# Patient Record
Sex: FEMALE | Race: WHITE | NOT HISPANIC OR LATINO | Employment: FULL TIME | URBAN - METROPOLITAN AREA
[De-identification: names, ages, dates, MRNs, and addresses within clinical notes are randomized per-mention and may not be internally consistent; named-entity substitution may affect disease eponyms.]

---

## 2017-06-16 ENCOUNTER — HOSPITAL ENCOUNTER (OUTPATIENT)
Dept: GENERAL RADIOLOGY | Facility: HOSPITAL | Age: 37
Discharge: HOME | End: 2017-06-16
Attending: FAMILY MEDICINE | Admitting: FAMILY MEDICINE

## 2017-06-16 ENCOUNTER — TRANSCRIBE ORDERS (OUTPATIENT)
Dept: ADMINISTRATIVE | Facility: HOSPITAL | Age: 37
End: 2017-06-16

## 2017-06-16 DIAGNOSIS — S99.911A ANKLE INJURY, RIGHT, INITIAL ENCOUNTER: Primary | ICD-10-CM

## 2017-06-16 PROCEDURE — 73610 X-RAY EXAM OF ANKLE: CPT

## 2018-03-15 ENCOUNTER — OFFICE VISIT (OUTPATIENT)
Dept: OBSTETRICS AND GYNECOLOGY | Facility: CLINIC | Age: 38
End: 2018-03-15

## 2018-03-15 VITALS — RESPIRATION RATE: 14 BRPM | SYSTOLIC BLOOD PRESSURE: 118 MMHG | WEIGHT: 226 LBS | DIASTOLIC BLOOD PRESSURE: 76 MMHG

## 2018-03-15 DIAGNOSIS — N90.89 CLITORAL IRRITATION: Primary | ICD-10-CM

## 2018-03-15 PROBLEM — F17.200 SMOKER: Status: ACTIVE | Noted: 2018-03-15

## 2018-03-15 PROBLEM — Z01.419 WELL WOMAN EXAM WITH ROUTINE GYNECOLOGICAL EXAM: Status: ACTIVE | Noted: 2018-03-15

## 2018-03-15 PROCEDURE — 99202 OFFICE O/P NEW SF 15 MIN: CPT | Performed by: OBSTETRICS & GYNECOLOGY

## 2018-03-15 RX ORDER — RANITIDINE 150 MG/1
150 TABLET ORAL 2 TIMES DAILY
COMMUNITY
End: 2020-06-05

## 2018-03-15 RX ORDER — LIDOCAINE AND PRILOCAINE 25; 25 MG/G; MG/G
CREAM TOPICAL
Qty: 5 G | Refills: 0 | Status: SHIPPED | OUTPATIENT
Start: 2018-03-15 | End: 2018-11-02

## 2018-03-15 NOTE — PROGRESS NOTES
Subjective   Chief Complaint   Patient presents with   • Establish Care     Alla Trammell is a 38 y.o. year old .  Patient's last menstrual period was 2018 (exact date).  She presents to be seen because of Trouble with a vertical clitoral fontana piercing.  The retention ball on one and got displaced were dislodged during masturbation.  It is now minimally symptomatic.  It was irritated and red for a while but that is improved.  It was placed about 15 years ago.  When she is hoping is that the piercing to be manipulated in such a fashion that it is now correctly oriented.    OTHER THINGS SHE WANTS TO DISCUSS TODAY:  Nothing else    The following portions of the patient's history were reviewed and updated as appropriate:current medications, allergies, past family history, past medical history, past social history and past surgical history    Smoking status: Current Every Day Smoker                                                   Packs/day: 0.25      Years: 0.00         Types: Cigarettes     Start date:      Smokeless tobacco: Not on file                       Review of Systems  Constitutional POS: nothing reported    NEG: anorexia or night sweats   Genitourinary POS: nothing reported    NEG: dysuria or hematuria   Gastointestinal POS: nothing reported    NEG: bloating, change in bowel habits, melena or reflux symptoms   Integument POS: nothing reported    NEG: moles that are changing in size, shape, color or rashes   Breast POS: nothing reported    NEG: persistent breast lump, skin dimpling or nipple discharge         Objective   /76   Resp 14   Wt 103 kg (226 lb)   LMP 2018 (Exact Date)   Breastfeeding? No     General:  well developed; well nourished  no acute distress   Pelvis: Clinical staff was present for exam  External genitalia:  The vertical clitoral piercing is intact.  The ball at the upper strut is buried below the skin which is intact.  The ball at the lower portion of  the strut protrudes normally below the clitoral fontana     Lab Review   No data reviewed    Imaging   No data reviewed        Assessment   1. Buried retention ball of clitoral piercing     Plan   1. We use EMLA cream topically and then have her come back.  At that point anticipate small injection with lidocaine followed by unroofing the ball  2. The importance of keeping all planned follow-up and taking all medications as prescribed was emphasized.    New Medications Ordered This Visit   Medications   • lidocaine-prilocaine (EMLA) 2.5-2.5 % cream     Sig: Every 30 minutes for 2-3 hours in advance of the appointment     Dispense:  5 g     Refill:  0          This note was electronically signed.    Moise Grove M.D.  March 15, 2018    Note: Speech recognition transcription software may have been used to create portions of this document.  An attempt at proofreading has been made but errors in transcription could still be present.

## 2018-03-23 ENCOUNTER — OFFICE VISIT (OUTPATIENT)
Dept: OBSTETRICS AND GYNECOLOGY | Facility: CLINIC | Age: 38
End: 2018-03-23

## 2018-03-23 VITALS — SYSTOLIC BLOOD PRESSURE: 122 MMHG | WEIGHT: 224 LBS | DIASTOLIC BLOOD PRESSURE: 76 MMHG | RESPIRATION RATE: 14 BRPM

## 2018-03-23 DIAGNOSIS — N90.89 CLITORAL IRRITATION: Primary | ICD-10-CM

## 2018-03-23 PROCEDURE — 99212 OFFICE O/P EST SF 10 MIN: CPT | Performed by: OBSTETRICS & GYNECOLOGY

## 2018-03-23 NOTE — PROGRESS NOTES
She comes in today having applied the EMLA cream in advance.  The purpose of today's visit is to help push the leading edge of the piercing back through the skin.  The area was injected with 2 cc of lidocaine and epinephrine.  At that point an 18-gauge needle was used to unroofed the opening that was present just slightly.  At that point the bulk be pushed through the opening area and bleeding was scant.  She was encouraged to keep the piercing in place for the next 3-4 weeks so as to allow the tract to become more permanently patent.  If she has issues with fever, redness or drainage she'll let me now.      Moise Grove M.D.  March 23, 2018  1:17 PM

## 2018-11-02 ENCOUNTER — OFFICE VISIT (OUTPATIENT)
Dept: OBSTETRICS AND GYNECOLOGY | Facility: CLINIC | Age: 38
End: 2018-11-02

## 2018-11-02 ENCOUNTER — LAB (OUTPATIENT)
Dept: LAB | Facility: HOSPITAL | Age: 38
End: 2018-11-02

## 2018-11-02 VITALS
DIASTOLIC BLOOD PRESSURE: 68 MMHG | WEIGHT: 221 LBS | SYSTOLIC BLOOD PRESSURE: 128 MMHG | BODY MASS INDEX: 37.73 KG/M2 | HEIGHT: 64 IN

## 2018-11-02 DIAGNOSIS — Z01.419 WELL WOMAN EXAM: Primary | ICD-10-CM

## 2018-11-02 DIAGNOSIS — Z70.8 SEXUALLY TRANSMITTED DISEASE COUNSELING: ICD-10-CM

## 2018-11-02 DIAGNOSIS — Z30.09 GENERAL COUNSELING AND ADVICE ON CONTRACEPTIVE MANAGEMENT: ICD-10-CM

## 2018-11-02 LAB
HBV SURFACE AG SERPL QL IA: NORMAL
HCV AB SER DONR QL: NORMAL
HIV1+2 AB SER QL: NORMAL

## 2018-11-02 PROCEDURE — 87340 HEPATITIS B SURFACE AG IA: CPT

## 2018-11-02 PROCEDURE — G0432 EIA HIV-1/HIV-2 SCREEN: HCPCS

## 2018-11-02 PROCEDURE — 36415 COLL VENOUS BLD VENIPUNCTURE: CPT | Performed by: OBSTETRICS & GYNECOLOGY

## 2018-11-02 PROCEDURE — 86803 HEPATITIS C AB TEST: CPT

## 2018-11-02 PROCEDURE — 99395 PREV VISIT EST AGE 18-39: CPT | Performed by: OBSTETRICS & GYNECOLOGY

## 2018-11-02 PROCEDURE — 86592 SYPHILIS TEST NON-TREP QUAL: CPT | Performed by: OBSTETRICS & GYNECOLOGY

## 2018-11-02 NOTE — PROGRESS NOTES
Subjective   Chief Complaint   Patient presents with   • Gynecologic Exam     Maine esquivel, here for annual exam, desires contraception. C/O's impending vaginal infection     Alla Trammell is a 38 y.o. year old  presenting to be seen for her annual exam.     SEXUAL Hx:  She is currently sexually active.  In the past year there there has been MORE THAN ONE new sexual partner.    Condoms are always used.  She would like to be screened for STD's at today's exam.  Current birth control method: condoms.  She is not happy with her current method of contraception and does want to discuss alternative methods of contraception.  MENSTRUAL Hx:  Patient's last menstrual period was 10/22/2018 (exact date).  In the past 6 months her cycles have been regular, predictable and occur monthly.  Her menstrual flow is typically moderately heavy.   Each month on average there are roughly 2 day(s) of very heavy flow. Duration 6-7 days  Intermenstrual bleeding is present but none in the past 6 months  Post-coital bleeding is absent.  Dysmenorrhea: moderate and is not affecting her activities of daily living  PMS: moderate and is not affecting her activities of daily living  Her cycles ARE a source of concern for her that she wishes to discuss today.  HEALTH Hx:  She exercises regularly: no (but is planning to start exercising more ).  She wears her seat belt: yes.  She has concerns about domestic violence: no.  OTHER THINGS SHE WANTS TO DISCUSS TODAY:  She desires to discuss long acting contraception today. Potentially interested in IUD.  She reports her ex- had been cheating on her.  She would like to be screened for STDs today.    The following portions of the patient's history were reviewed and updated as appropriate:problem list, current medications, allergies, past family history, past medical history, past social history and past surgical history.    Smoking status: Current Every Day Smoker                               "                     Packs/day: 0.25      Years: 0.00         Types: Cigarettes     Start date: 1999     Smokeless tobacco: Not on file                       Review of Systems  Constitutional POS: nothing reported    NEG: anorexia or night sweats   Genitourinary POS: nothing reported    NEG: dysuria or hematuria      Gastointestinal POS: nothing reported    NEG: bloating, change in bowel habits, melena or reflux symptoms   Integument POS: nothing reported    NEG: moles that are changing in size, shape, color or rashes   Breast POS: nothing reported    NEG: persistent breast lump, skin dimpling or nipple discharge        Objective   /68 (Patient Position: Sitting)   Ht 162.6 cm (64\")   Wt 100 kg (221 lb)   LMP 10/22/2018 (Exact Date)   BMI 37.93 kg/m²     General:  well developed; well nourished  no acute distress   Skin:  No suspicious lesions seen   Thyroid: normal to inspection and palpation   Breasts:  Examined in supine position  Symmetric without masses or skin dimpling  Nipples normal without inversion, lesions or discharge  There are no palpable axillary nodes   Abdomen: soft, non-tender; no masses  no umbilical or inginual hernias are present  no hepato-splenomegaly   Pelvis: Clinical staff was present for exam  External genitalia:  normal appearance of the external genitalia including Bartholin's and Chisana's glands.  :  urethral meatus normal;  Vaginal:  normal pink mucosa without prolapse or lesions.  Cervix:  normal appearance.  Uterus:  normal size, shape and consistency.  Adnexa:  normal bimanual exam of the adnexa.        Assessment   1. Normal GYN exam  2. STI screening  3. Contraceptive counseling  4. Vaginal irritation      Plan   1. Pap and STD testing was done today.  If she does not receive the results of the Pap within 2 weeks  time, she was instructed to call to find out the results.  I explained to Alla that the recommendations for Pap smear interval in a low risk patient's has " lengthened to 3 years time.  I encouraged her to be seen yearly for a full physical exam including breast and pelvic exam even during the off years when PAP's will not be performed.  2. Serum STD testing ordered as well  3. No prescription was given or electronically sent at today's visit  4. The importance of keeping all planned follow-up and taking all medications as prescribed was emphasized.  5. Follow up for annual exam and for IUD placement in 1 year and in 1-2 weeks.     No orders of the defined types were placed in this encounter.         This note was electronically signed.    Venice Moralez MD  November 2, 2018    Note: Speech recognition transcription software may have been used to create portions of this document.  An attempt at proofreading has been made but errors in transcription could still be present.

## 2018-11-05 ENCOUNTER — TELEPHONE (OUTPATIENT)
Dept: OBSTETRICS AND GYNECOLOGY | Facility: CLINIC | Age: 38
End: 2018-11-05

## 2018-11-05 LAB — RPR SER QL: NORMAL

## 2018-11-05 NOTE — TELEPHONE ENCOUNTER
Called patient and reviewed negative STD bloodwork. Will call back once remainder of labs results.     Venice Moralez MD

## 2018-11-06 ENCOUNTER — TELEPHONE (OUTPATIENT)
Dept: OBSTETRICS AND GYNECOLOGY | Facility: CLINIC | Age: 38
End: 2018-11-06

## 2018-11-06 RX ORDER — METRONIDAZOLE 500 MG/1
500 TABLET ORAL 2 TIMES DAILY
Qty: 14 TABLET | Refills: 0 | Status: SHIPPED | OUTPATIENT
Start: 2018-11-06 | End: 2018-11-13

## 2018-11-06 NOTE — TELEPHONE ENCOUNTER
Reviewed negative STD cultures with patient and + for BV. Flagyl rx sent to pharmacy and advised no alcohol consumption while taking it. Will call back once pap results are back.   Venice Moralez MD

## 2018-12-07 ENCOUNTER — OFFICE VISIT (OUTPATIENT)
Dept: OBSTETRICS AND GYNECOLOGY | Facility: CLINIC | Age: 38
End: 2018-12-07

## 2018-12-07 VITALS — DIASTOLIC BLOOD PRESSURE: 70 MMHG | SYSTOLIC BLOOD PRESSURE: 122 MMHG

## 2018-12-07 DIAGNOSIS — Z30.09 GENERAL COUNSELING AND ADVICE ON CONTRACEPTIVE MANAGEMENT: Primary | ICD-10-CM

## 2018-12-07 DIAGNOSIS — Z30.017 ENCOUNTER FOR INITIAL PRESCRIPTION OF NEXPLANON: ICD-10-CM

## 2018-12-07 PROCEDURE — 99213 OFFICE O/P EST LOW 20 MIN: CPT | Performed by: OBSTETRICS & GYNECOLOGY

## 2018-12-07 PROCEDURE — 11981 INSERTION DRUG DLVR IMPLANT: CPT | Performed by: OBSTETRICS & GYNECOLOGY

## 2018-12-07 NOTE — PROGRESS NOTES
Nexplanon Insertion    Patient's last menstrual period was 11/26/2018 (approximate).    Date of procedure:  12/7/2018    Risks and benefits discussed? yes  All questions answered? yes  Consents given by the patient  Written consent obtained? yes    Local anesthesia used:  yes - 5 cc's of  Meds; anesthesia local: 1% lidocaine    Procedure documentation:    The upper right arm (dominant) was marked at the intended site of insertion.  She has tattoos on her left arm. Betadine was used to cleanse the skin.  Local anesthesia was injected.  The Nexplanon was placed subdermally without difficulty.  The devise was able to be palpated in the arm by both myself and Alla.  Steri-strips were then placed across the site of insertion and the arm was wrapped.    She tolerated the procedure well.  There were no complications.  EBL was minimal.    Post procedure instructions: Remove the wrapping in 24 hours and the steri-strips in 5 days.    Follow up needed: PRN    This note was electronically signed.    Venice Moralez MD  December 7, 2018

## 2018-12-07 NOTE — PROGRESS NOTES
Subjective   Chief Complaint   Patient presents with   • Gynecologic Exam     desires OCP's instead of IUD     Alla Trammell is a 38 y.o. year old .  Patient's last menstrual period was 2018 (approximate).  She presents to be seen because of desiring to talk about contraception again. She no longer wants the IUD but definitely desires contraception. Reports her partner does not want her to have the IUD because one of his previous partners had one and he could feel the strings. She reports smoker 1/2 ppd    OTHER THINGS SHE WANTS TO DISCUSS TODAY:  Nothing else    The following portions of the patient's history were reviewed and updated as appropriate:current medications and allergies    Social History    Tobacco Use      Smoking status: Current Every Day Smoker        Packs/day: 0.50        Types: Cigarettes        Start date:     Review of Systems  Constitutional POS: nothing reported    NEG: anorexia or night sweats   Genitourinary POS: nothing reported    NEG: dysuria or hematuria   Gastointestinal POS: nothing reported    NEG: bloating, change in bowel habits, melena or reflux symptoms   Integument POS: nothing reported    NEG: moles that are changing in size, shape, color or rashes   Breast POS: nothing reported    NEG: persistent breast lump, skin dimpling or nipple discharge         Objective   /70 (Patient Position: Sitting)   LMP 2018 (Approximate)     General:  well developed; well nourished  no acute distress   Skin:  Not performed.   Thyroid: not examined   Lungs:  breathing is unlabored   Heart:  Not performed.   Breasts:  Not performed.   Abdomen: Not performed.   Pelvis: Not performed.     Lab Review   No data reviewed    Imaging   No data reviewed        Assessment   1. Contraceptive counseling     Plan   1. Reviewed options again with patient in detail including avoidance of estrogen containing contraception due to age >35 years old and smoking 1/2 ppd. Reviewed  progesterone only options and she desires nexplanon. See procedure note  2. The importance of keeping all planned follow-up and taking all medications as prescribed was emphasized.  3. Follow up for annual exam in one year or sooner if needed.     No orders of the defined types were placed in this encounter.         This note was electronically signed.    Venice Moralez MD  December 7, 2018    Note: Speech recognition transcription software may have been used to create portions of this document.  An attempt at proofreading has been made but errors in transcription could still be present.

## 2020-02-27 ENCOUNTER — TRANSCRIBE ORDERS (OUTPATIENT)
Dept: ADMINISTRATIVE | Facility: HOSPITAL | Age: 40
End: 2020-02-27

## 2020-02-27 ENCOUNTER — HOSPITAL ENCOUNTER (OUTPATIENT)
Dept: GENERAL RADIOLOGY | Facility: HOSPITAL | Age: 40
Discharge: HOME | End: 2020-02-27
Admitting: NURSE PRACTITIONER

## 2020-02-27 DIAGNOSIS — M25.50 ARTHRALGIA, UNSPECIFIED JOINT: ICD-10-CM

## 2020-02-27 DIAGNOSIS — M25.50 ARTHRALGIA, UNSPECIFIED JOINT: Primary | ICD-10-CM

## 2020-02-27 PROCEDURE — 72052 X-RAY EXAM NECK SPINE 6/>VWS: CPT

## 2020-03-23 ENCOUNTER — TELEPHONE (OUTPATIENT)
Dept: OBSTETRICS AND GYNECOLOGY | Facility: CLINIC | Age: 40
End: 2020-03-23

## 2020-03-23 NOTE — TELEPHONE ENCOUNTER
Called pt; pt does not think she has a yeast infection-offered pt appt on Thursday  
Dr Moralez Pt    Pt calls with complaints of an episode of sharp vaginal pain, vaginal odor and discharge (white and thick).  Sx started odor two weeks ago, discharge two days ago.  She had one episode today of sharp pain in vaginal area lasting about 30 minutes.  No bleeding, no fever.    Callback [phone]  [facility]  
7

## 2020-03-25 ENCOUNTER — TELEPHONE (OUTPATIENT)
Dept: OBSTETRICS AND GYNECOLOGY | Facility: CLINIC | Age: 40
End: 2020-03-25

## 2020-03-25 NOTE — TELEPHONE ENCOUNTER
Dr Moralez Pt    Pt was called to screen for corona virus.  She stated that she was in contact with a person on March 14, 2020 who was tested for [id number].  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 The [id number] test was negative.    Pt was told she would be called back by Dr. Moralez, per Dr. Moralez.    Callback [phone]

## 2020-03-25 NOTE — TELEPHONE ENCOUNTER
Called patient and reviewed complaint.  She reports she intermittently had a sharp pain on her cervix a few weeks ago.  She currently denies any pain over the past 24 to 48 hours.  She reports she did notice some discharge within the past few days but currently reports it is better and currently has no itching or odor.  She does have a Nexplanon in place for contraceptive benefit.  She reports her partner did have unprotected intercourse with another partner in the past.  Reviewed with patient and she desires to have STD screening and she can be scheduled to be seen next week given that would be 14 days from her time of exposure even though the person was negative for Covid 19.  So can patient please be called to see if she desires to schedule an appointment next week for STD screening.  Otherwise if she has no issues she does not have to be seen in the office.    Thanks,  Venice Moralez MD

## 2020-06-02 ENCOUNTER — APPOINTMENT (OUTPATIENT)
Dept: PREADMISSION TESTING | Facility: HOSPITAL | Age: 40
End: 2020-06-02

## 2020-06-02 PROCEDURE — C9803 HOPD COVID-19 SPEC COLLECT: HCPCS

## 2020-06-02 PROCEDURE — U0002 COVID-19 LAB TEST NON-CDC: HCPCS

## 2020-06-02 PROCEDURE — U0004 COV-19 TEST NON-CDC HGH THRU: HCPCS

## 2020-06-03 ENCOUNTER — APPOINTMENT (OUTPATIENT)
Dept: PREADMISSION TESTING | Facility: HOSPITAL | Age: 40
End: 2020-06-03

## 2020-06-03 LAB
REF LAB TEST METHOD: NORMAL
SARS-COV-2 RNA RESP QL NAA+PROBE: NOT DETECTED

## 2020-06-05 ENCOUNTER — OUTSIDE FACILITY SERVICE (OUTPATIENT)
Dept: GASTROENTEROLOGY | Facility: CLINIC | Age: 40
End: 2020-06-05

## 2020-06-05 ENCOUNTER — LAB REQUISITION (OUTPATIENT)
Dept: LAB | Facility: HOSPITAL | Age: 40
End: 2020-06-05

## 2020-06-05 ENCOUNTER — TELEPHONE (OUTPATIENT)
Dept: GASTROENTEROLOGY | Facility: CLINIC | Age: 40
End: 2020-06-05

## 2020-06-05 DIAGNOSIS — R12 HEARTBURN: ICD-10-CM

## 2020-06-05 DIAGNOSIS — K21.00 GASTROESOPHAGEAL REFLUX DISEASE WITH ESOPHAGITIS: Primary | ICD-10-CM

## 2020-06-05 DIAGNOSIS — K21.9 GASTRO-ESOPHAGEAL REFLUX DISEASE WITHOUT ESOPHAGITIS: ICD-10-CM

## 2020-06-05 PROCEDURE — 88305 TISSUE EXAM BY PATHOLOGIST: CPT | Performed by: INTERNAL MEDICINE

## 2020-06-05 PROCEDURE — 43239 EGD BIOPSY SINGLE/MULTIPLE: CPT | Performed by: INTERNAL MEDICINE

## 2020-06-05 RX ORDER — PANTOPRAZOLE SODIUM 40 MG/1
40 TABLET, DELAYED RELEASE ORAL DAILY
Qty: 30 TABLET | Refills: 5 | Status: SHIPPED | OUTPATIENT
Start: 2020-06-05 | End: 2020-09-15

## 2020-06-05 NOTE — TELEPHONE ENCOUNTER
Dr. Steven  Ms. Selby called and stated he  today thought you had said you were going to send a Rx? Can you advise.  Thank you,  Karoline

## 2020-06-08 LAB
CYTO UR: NORMAL
LAB AP CASE REPORT: NORMAL
LAB AP CLINICAL INFORMATION: NORMAL
LAB AP DIAGNOSIS COMMENT: NORMAL
PATH REPORT.FINAL DX SPEC: NORMAL
PATH REPORT.GROSS SPEC: NORMAL

## 2020-09-11 ENCOUNTER — TELEMEDICINE (OUTPATIENT)
Dept: GASTROENTEROLOGY | Facility: CLINIC | Age: 40
End: 2020-09-11

## 2020-09-11 DIAGNOSIS — K21.00 GASTROESOPHAGEAL REFLUX DISEASE WITH ESOPHAGITIS: Primary | ICD-10-CM

## 2020-09-11 DIAGNOSIS — R12 HEARTBURN: ICD-10-CM

## 2020-09-11 PROCEDURE — 99214 OFFICE O/P EST MOD 30 MIN: CPT | Performed by: INTERNAL MEDICINE

## 2020-09-12 NOTE — PROGRESS NOTES
PCP: Ethan Newman MD    No chief complaint on file.  Issue with heartburn.    History of Present Illness:   HPI  This was a video visit. The patient consented for a video visit.  Mrs. Selby says the Protonix medication does not give her any relief. She may have one or two good days during the course of a week. There is a sour brash taste in the back of her throat. The patient denies difficult or painful swallowing. Mrs. Selby says that previously Dexilant gave complete relief. However, due to her insurance this was not covered. She has been on Omeprazole and Pepcid without relief.  Mrs. Selby denies any localized abdominal pain. There is no issue with nausea or bloating.  Past Medical History:   Diagnosis Date   • H/O chlamydia infection 2001   • History of anxiety 2017       Past Surgical History:   Procedure Laterality Date   • GUM SURGERY  2001   • WISDOM TOOTH EXTRACTION  2000         Current Outpatient Medications:   •  pantoprazole (PROTONIX) 40 MG EC tablet, Take 1 tablet by mouth Daily., Disp: 30 tablet, Rfl: 5    No Known Allergies    Family History   Problem Relation Age of Onset   • Breast cancer Maternal Grandmother         70   • Ovarian cancer Mother    • Colon cancer Neg Hx        Social History     Socioeconomic History   • Marital status:      Spouse name: Not on file   • Number of children: Not on file   • Years of education: Not on file   • Highest education level: Not on file   Tobacco Use   • Smoking status: Current Every Day Smoker     Packs/day: 0.50     Types: Cigarettes     Start date: 1999   Substance and Sexual Activity   • Alcohol use: Yes     Comment: Social ETOH use    • Drug use: No   • Sexual activity: Yes     Partners: Male       Review of Systems   Constitutional: Negative for appetite change, fatigue, fever and unexpected weight change.   HENT: Negative for dental problem, mouth sores, postnasal drip, sneezing, trouble swallowing and voice change.    Eyes: Negative for  pain, redness and itching.   Respiratory: Negative for cough, shortness of breath and wheezing.    Cardiovascular: Negative for chest pain, palpitations and leg swelling.   Gastrointestinal: Negative for abdominal distention, abdominal pain, anal bleeding, blood in stool, constipation, diarrhea, nausea, rectal pain and vomiting.        Heartburn   Endocrine: Negative for cold intolerance, heat intolerance, polydipsia and polyuria.   Genitourinary: Negative for dysuria, enuresis, flank pain, hematuria and urgency.   Musculoskeletal: Negative for arthralgias, back pain, joint swelling and myalgias.   Skin: Negative for color change, pallor and rash.   Allergic/Immunologic: Negative for environmental allergies, food allergies and immunocompromised state.   Neurological: Negative for dizziness, tremors, seizures, facial asymmetry, numbness and headaches.   Psychiatric/Behavioral: Negative for behavioral problems, dysphoric mood, hallucinations and self-injury.       There were no vitals filed for this visit.    Physical Exam   Constitutional: She is oriented to person, place, and time. She appears well-nourished. No distress.   HENT:   Head: Atraumatic.   Mouth/Throat: Oropharynx is clear and moist.   Eyes: EOM are normal. No scleral icterus.   Musculoskeletal: Normal range of motion. She exhibits no edema.   Neurological: She is alert and oriented to person, place, and time. She exhibits normal muscle tone.   Skin: Skin is dry. No erythema.   Psychiatric: She has a normal mood and affect. Her behavior is normal.       Diagnoses and all orders for this visit:    Gastroesophageal reflux disease with esophagitis    Heartburn    The patient is experiencing symptoms regularly. There are no warning issues with difficult swallowing or weight loss.      Plan: Discussed a trial of Prevacid. Also suggest to check with her formulary with regard to Dexilant.            Continue lifestyle modifications.             Follow up in 6  months.      This was a video visit for 15 minutes.

## 2020-09-15 ENCOUNTER — TELEPHONE (OUTPATIENT)
Dept: GASTROENTEROLOGY | Facility: CLINIC | Age: 40
End: 2020-09-15

## 2020-09-15 DIAGNOSIS — K21.00 GASTROESOPHAGEAL REFLUX DISEASE WITH ESOPHAGITIS: Primary | ICD-10-CM

## 2020-09-15 RX ORDER — LANSOPRAZOLE 30 MG/1
30 CAPSULE, DELAYED RELEASE ORAL DAILY
Qty: 30 CAPSULE | Refills: 5 | Status: SHIPPED | OUTPATIENT
Start: 2020-09-15

## 2020-09-15 NOTE — TELEPHONE ENCOUNTER
Called and s/w pt in re: to Patient Assistance for Dexilant; pt request forms to be mailed and she will complete and return them asap.  She has also request RX lansoprazole until determination for Dexilant is sent. I advised I would consult with Dr. Steven in that regards. Pt had no further concerns at this time.

## 2020-10-19 ENCOUNTER — TELEPHONE (OUTPATIENT)
Dept: GASTROENTEROLOGY | Facility: CLINIC | Age: 40
End: 2020-10-19

## 2020-10-19 NOTE — TELEPHONE ENCOUNTER
Patient called to inform you that lansoprazole is working and she is going to continue that for now.

## 2021-02-08 ENCOUNTER — TRANSCRIBE ORDERS (OUTPATIENT)
Dept: ADMINISTRATIVE | Facility: HOSPITAL | Age: 41
End: 2021-02-08

## 2021-02-08 DIAGNOSIS — N63.25 BREAST LUMP ON LEFT SIDE AT 6 O'CLOCK POSITION: Primary | ICD-10-CM

## 2021-03-08 ENCOUNTER — HOSPITAL ENCOUNTER (OUTPATIENT)
Dept: MAMMOGRAPHY | Facility: HOSPITAL | Age: 41
Discharge: HOME | End: 2021-03-08

## 2021-03-08 ENCOUNTER — HOSPITAL ENCOUNTER (OUTPATIENT)
Dept: ULTRASOUND IMAGING | Facility: HOSPITAL | Age: 41
Discharge: HOME | End: 2021-03-08

## 2021-03-08 DIAGNOSIS — N63.25 BREAST LUMP ON LEFT SIDE AT 6 O'CLOCK POSITION: ICD-10-CM

## 2021-03-08 PROCEDURE — 76642 ULTRASOUND BREAST LIMITED: CPT | Performed by: RADIOLOGY

## 2021-03-08 PROCEDURE — 77066 DX MAMMO INCL CAD BI: CPT | Performed by: RADIOLOGY

## 2021-03-08 PROCEDURE — 76642 ULTRASOUND BREAST LIMITED: CPT

## 2021-03-08 PROCEDURE — 77066 DX MAMMO INCL CAD BI: CPT

## 2021-03-08 PROCEDURE — 77062 BREAST TOMOSYNTHESIS BI: CPT | Performed by: RADIOLOGY

## 2021-03-08 PROCEDURE — G0279 TOMOSYNTHESIS, MAMMO: HCPCS

## 2021-03-12 ENCOUNTER — TRANSCRIBE ORDERS (OUTPATIENT)
Dept: MAMMOGRAPHY | Facility: HOSPITAL | Age: 41
End: 2021-03-12

## 2021-03-12 DIAGNOSIS — R92.8 ABNORMAL MAMMOGRAM: Primary | ICD-10-CM

## 2021-09-14 ENCOUNTER — HOSPITAL ENCOUNTER (OUTPATIENT)
Dept: ULTRASOUND IMAGING | Facility: HOSPITAL | Age: 41
Discharge: HOME | End: 2021-09-14

## 2021-09-14 ENCOUNTER — HOSPITAL ENCOUNTER (OUTPATIENT)
Dept: MAMMOGRAPHY | Facility: HOSPITAL | Age: 41
Discharge: HOME | End: 2021-09-14

## 2021-09-14 ENCOUNTER — TRANSCRIBE ORDERS (OUTPATIENT)
Dept: MAMMOGRAPHY | Facility: HOSPITAL | Age: 41
End: 2021-09-14

## 2021-09-14 DIAGNOSIS — R92.8 ABNORMAL MAMMOGRAM: ICD-10-CM

## 2021-09-14 DIAGNOSIS — R92.8 ABNORMAL MAMMOGRAM: Primary | ICD-10-CM

## 2021-09-14 PROCEDURE — 77066 DX MAMMO INCL CAD BI: CPT | Performed by: RADIOLOGY

## 2021-09-14 PROCEDURE — 76642 ULTRASOUND BREAST LIMITED: CPT

## 2021-09-14 PROCEDURE — G0279 TOMOSYNTHESIS, MAMMO: HCPCS

## 2021-09-14 PROCEDURE — 76642 ULTRASOUND BREAST LIMITED: CPT | Performed by: RADIOLOGY

## 2021-09-14 PROCEDURE — 77066 DX MAMMO INCL CAD BI: CPT

## 2022-03-17 ENCOUNTER — TRANSCRIBE ORDERS (OUTPATIENT)
Dept: MAMMOGRAPHY | Facility: HOSPITAL | Age: 42
End: 2022-03-17

## 2022-03-17 ENCOUNTER — HOSPITAL ENCOUNTER (OUTPATIENT)
Dept: MAMMOGRAPHY | Facility: HOSPITAL | Age: 42
Discharge: HOME | End: 2022-03-17
Admitting: NURSE PRACTITIONER

## 2022-03-17 DIAGNOSIS — R92.8 ABNORMAL MAMMOGRAM: Primary | ICD-10-CM

## 2022-03-17 DIAGNOSIS — R92.8 ABNORMAL MAMMOGRAM: ICD-10-CM

## 2022-03-17 PROCEDURE — 77066 DX MAMMO INCL CAD BI: CPT | Performed by: RADIOLOGY

## 2022-03-17 PROCEDURE — 77066 DX MAMMO INCL CAD BI: CPT

## 2022-03-17 PROCEDURE — G0279 TOMOSYNTHESIS, MAMMO: HCPCS

## 2022-03-17 PROCEDURE — 77062 BREAST TOMOSYNTHESIS BI: CPT | Performed by: RADIOLOGY

## 2022-10-21 ENCOUNTER — HOSPITAL ENCOUNTER (OUTPATIENT)
Dept: ULTRASOUND IMAGING | Facility: HOSPITAL | Age: 42
Discharge: HOME | End: 2022-10-21

## 2022-10-21 ENCOUNTER — HOSPITAL ENCOUNTER (OUTPATIENT)
Dept: MAMMOGRAPHY | Facility: HOSPITAL | Age: 42
Discharge: HOME | End: 2022-10-21

## 2022-10-21 ENCOUNTER — TRANSCRIBE ORDERS (OUTPATIENT)
Dept: MAMMOGRAPHY | Facility: HOSPITAL | Age: 42
End: 2022-10-21

## 2022-10-21 DIAGNOSIS — R92.8 ABNORMAL MAMMOGRAM: Primary | ICD-10-CM

## 2022-10-21 DIAGNOSIS — R92.8 ABNORMAL MAMMOGRAM: ICD-10-CM

## 2022-10-21 PROCEDURE — 77066 DX MAMMO INCL CAD BI: CPT | Performed by: RADIOLOGY

## 2022-10-21 PROCEDURE — 76642 ULTRASOUND BREAST LIMITED: CPT | Performed by: RADIOLOGY

## 2022-10-21 PROCEDURE — 76642 ULTRASOUND BREAST LIMITED: CPT

## 2022-10-21 PROCEDURE — 77062 BREAST TOMOSYNTHESIS BI: CPT | Performed by: RADIOLOGY

## 2022-10-21 PROCEDURE — 77066 DX MAMMO INCL CAD BI: CPT

## 2025-03-14 ENCOUNTER — OFFICE VISIT (OUTPATIENT)
Age: 45
End: 2025-03-14
Payer: COMMERCIAL

## 2025-03-14 VITALS — HEIGHT: 64 IN | WEIGHT: 243 LBS | BODY MASS INDEX: 41.48 KG/M2

## 2025-03-14 DIAGNOSIS — N13.2 URETERAL STONE WITH HYDRONEPHROSIS: Primary | ICD-10-CM

## 2025-03-14 PROCEDURE — 99204 OFFICE O/P NEW MOD 45 MIN: CPT | Performed by: UROLOGY

## 2025-03-14 RX ORDER — SCOPOLAMINE 1 MG/3D
1 PATCH, EXTENDED RELEASE TRANSDERMAL CONTINUOUS
Status: CANCELLED | OUTPATIENT
Start: 2025-03-14 | End: 2025-03-17

## 2025-03-14 RX ORDER — ATORVASTATIN CALCIUM 20 MG/1
TABLET, FILM COATED ORAL
COMMUNITY
Start: 2024-11-27

## 2025-03-14 RX ORDER — MELOXICAM 7.5 MG/1
15 TABLET ORAL ONCE
Status: CANCELLED | OUTPATIENT
Start: 2025-03-14 | End: 2025-03-14

## 2025-03-14 RX ORDER — HYDROCODONE BITARTRATE AND ACETAMINOPHEN 5; 325 MG/1; MG/1
TABLET ORAL
COMMUNITY
Start: 2025-03-07

## 2025-03-14 RX ORDER — ACETAMINOPHEN 500 MG
1000 TABLET ORAL ONCE
Status: CANCELLED | OUTPATIENT
Start: 2025-03-14 | End: 2025-03-14

## 2025-03-14 RX ORDER — TAMSULOSIN HYDROCHLORIDE 0.4 MG/1
CAPSULE ORAL
COMMUNITY
Start: 2025-03-10

## 2025-03-14 RX ORDER — GABAPENTIN 100 MG/1
600 CAPSULE ORAL ONCE
Status: CANCELLED | OUTPATIENT
Start: 2025-03-14 | End: 2025-03-14

## 2025-03-14 NOTE — PROGRESS NOTES
Office Note Kidney Stone      Patient Name: Alla Selby  : 1980   MRN: [id number]     Chief Complaint: History of Nephrolithiasis/Ureterolithiasis     Referring Provider: Neo El,*    History of Present Illness: Alla Selby is a 45 y.o. female who presents today for evaluation secondary to acute stone.  She has had multiple months with intermittent right flank pain.  Over the past few weeks she has had more consistent flank pain.  She underwent a CT scan 3/5/2025 which demonstrated 7 to 8 mm right proximal ureteral stone.  Today she is reporting worsened flank pain.  Denies lower urinary tract symptoms.  Denies dysuria or hematuria.  Denies prior history of stone disease or intervention for stone      Stone related history  Family history of stones:   no  Renal disease or anatomic abnormality: no  Malabsorptive disease or gastric bypass: no  Frequent UTI's    no  Parathyroid disease    no        Subjective      Review of System: Review of Systems   Genitourinary:  Negative for decreased urine volume, difficulty urinating, dysuria, enuresis, flank pain, frequency, hematuria and urgency.        I have reviewed the ROS documented by my clinical staff, updated as appropriate and I agree. Fab Zamorano MD    Past Medical History:   Past Medical History:   Diagnosis Date    H/O chlamydia infection     History of anxiety 2017       Past Surgical History:   Past Surgical History:   Procedure Laterality Date    GUM SURGERY      WISDOM TOOTH EXTRACTION         Family History:   Family History   Problem Relation Age of Onset    Breast cancer Maternal Grandmother         70    Ovarian cancer Mother     Colon cancer Neg Hx        Social History:   Social History     Socioeconomic History    Marital status: Single   Tobacco Use    Smoking status: Every Day     Current packs/day: 0.50     Average packs/day: 0.5 packs/day for 26.2 years (13.1 ttl pk-yrs)     Types: Cigarettes  "    Start date: 1999     Passive exposure: Current    Smokeless tobacco: Never   Substance and Sexual Activity    Alcohol use: Yes     Comment: Social ETOH use     Drug use: No    Sexual activity: Yes     Partners: Male       Medications:     Current Outpatient Medications:     atorvastatin (LIPITOR) 20 MG tablet, take 1 tablet by mouth every day at bedtime for 90 days, Disp: , Rfl:     diazePAM (VALIUM) 5 MG tablet, Take 1 tablet by mouth Daily., Disp: , Rfl:     Etonogestrel (NEXPLANON SC), Nexplanon, Disp: , Rfl:     HYDROcodone-acetaminophen (NORCO) 5-325 MG per tablet, , Disp: , Rfl:     lamoTRIgine (LaMICtal) 25 MG tablet, Take 3 tablets by mouth every night at bedtime., Disp: , Rfl:     lansoprazole (PREVACID) 30 MG capsule, Take 1 capsule by mouth Daily., Disp: 30 capsule, Rfl: 5    lisinopril-hydrochlorothiazide (PRINZIDE,ZESTORETIC) 10-12.5 MG per tablet, , Disp: , Rfl:     tamsulosin (FLOMAX) 0.4 MG capsule 24 hr capsule, , Disp: , Rfl:     albuterol sulfate  (90 Base) MCG/ACT inhaler, 1 INHALATION INHALED EVERY 4 HOURS, Disp: , Rfl:     azithromycin (Zithromax Z-Brandon) 250 MG tablet, Take 2 tablets by mouth on day 1, then 1 tablet daily on days 2-5, Disp: 6 tablet, Rfl: 0    promethazine-dextromethorphan (PROMETHAZINE-DM) 6.25-15 MG/5ML syrup, Take 5 mL by mouth At Night As Needed for Cough., Disp: 100 mL, Rfl: 0    Allergies:   No Known Allergies    Objective     Physical Exam:   Vital Signs:   Vitals:    03/14/25 1211   Weight: 110 kg (243 lb)   Height: 162.6 cm (64.02\")     Body mass index is 41.69 kg/m².     Physical Exam  Vitals and nursing note reviewed.   Constitutional:       Appearance: Normal appearance.   HENT:      Head: Normocephalic and atraumatic.   Cardiovascular:      Comments: Well perfused  Pulmonary:      Effort: Pulmonary effort is normal.   Abdominal:      General: Abdomen is flat.      Palpations: Abdomen is soft.   Musculoskeletal:         General: Normal range of motion. " "  Skin:     General: Skin is warm and dry.   Neurological:      General: No focal deficit present.      Mental Status: She is alert and oriented to person, place, and time. Mental status is at baseline.   Psychiatric:         Mood and Affect: Mood normal.         Behavior: Behavior normal.         Thought Content: Thought content normal.         Judgment: Judgment normal.         Labs:   Brief Urine Lab Results       None                 No results found for: \"GLUCOSE\", \"CALCIUM\", \"NA\", \"K\", \"CO2\", \"CL\", \"BUN\", \"CREATININE\", \"EGFRIFAFRI\", \"EGFRIFNONA\", \"BCR\", \"ANIONGAP\"    No results found for: \"WBC\", \"HGB\", \"HCT\", \"MCV\", \"PLT\"      Images:   No Images in the past 120 days found..    Measures:   Tobacco:   Alla Selby  reports that she has been smoking cigarettes. She started smoking about 26 years ago. She has a 13.1 pack-year smoking history. She has been exposed to tobacco smoke. She has never used smokeless tobacco. I have educated her on the risk of diseases from using tobacco product.     Assessment / Plan      Assessment/Plan:   Alla Selby is a 45 y.o. female who presented today with nephrolithiasis/ureterolithiasis.  Patient has been identified to have 7 to 8 mm obstructing right ureteral stone.  She is having significant flank pain, consistent flank pain.  Given continued presence of flank pain, inability to pass stone over a multi week history we have discussed the indication for intervention.  We have discussed the risks and benefits, indication for ureteroscopy and laser lithotripsy.  We have discussed periprocedural postoperative expectation including ureteral stent placement.  We will coordinate and schedule 3/17/2025    Diagnoses and all orders for this visit:    1. Ureteral stone with hydronephrosis (Primary)  -     acetaminophen (TYLENOL) tablet 1,000 mg  -     gabapentin (NEURONTIN) capsule 600 mg  -     meloxicam (MOBIC) tablet 15 mg  -     scopolamine patch 1 mg/72 hr  -     Case " Request; Standing  -     ceFAZolin (ANCEF) 2,000 mg in sodium chloride 0.9 % 100 mL IVPB  -     Case Request    Other orders  -     Follow Anesthesia Guidelines / Protocol; Future  -     Follow Anesthesia Guidelines / Protocol; Standing  -     Provide Patient With Enhanced Recovery Booklet(s) OR Handout; Standing  -     Verify NPO Status; Standing  -     Verify the Time Patient Completed G[place]ade / G2; Standing  -     Place Sequential Compression Device; Standing  -     Maintain Sequential Compression Device; Standing           I spent approximately 45 minutes providing clinical care for this patient; including review of patient's chart and provider documentation, face to face time spent with patient in examination room (obtaining history, performing physical exam, discussing diagnosis and management options), placing orders, and completing patient documentation.     Fab Zamorano MD  [facility] [place]

## 2025-03-16 ENCOUNTER — ANESTHESIA EVENT (OUTPATIENT)
Dept: PERIOP | Facility: HOSPITAL | Age: 45
End: 2025-03-16
Payer: COMMERCIAL

## 2025-03-16 RX ORDER — SODIUM CHLORIDE 0.9 % (FLUSH) 0.9 %
10 SYRINGE (ML) INJECTION EVERY 12 HOURS SCHEDULED
Status: CANCELLED | OUTPATIENT
Start: 2025-03-16

## 2025-03-17 ENCOUNTER — ANESTHESIA (OUTPATIENT)
Dept: PERIOP | Facility: HOSPITAL | Age: 45
End: 2025-03-17
Payer: COMMERCIAL

## 2025-03-17 ENCOUNTER — APPOINTMENT (OUTPATIENT)
Dept: GENERAL RADIOLOGY | Facility: HOSPITAL | Age: 45
End: 2025-03-17
Payer: COMMERCIAL

## 2025-03-17 ENCOUNTER — HOSPITAL ENCOUNTER (OUTPATIENT)
Facility: HOSPITAL | Age: 45
Setting detail: HOSPITAL OUTPATIENT SURGERY
Discharge: HOME | End: 2025-03-17
Attending: UROLOGY | Admitting: UROLOGY
Payer: COMMERCIAL

## 2025-03-17 VITALS
RESPIRATION RATE: 16 BRPM | DIASTOLIC BLOOD PRESSURE: 86 MMHG | OXYGEN SATURATION: 92 % | HEART RATE: 89 BPM | TEMPERATURE: 97.4 F | BODY MASS INDEX: 41.4 KG/M2 | HEIGHT: 64 IN | WEIGHT: 242.51 LBS | SYSTOLIC BLOOD PRESSURE: 137 MMHG

## 2025-03-17 DIAGNOSIS — N13.2 URETERAL STONE WITH HYDRONEPHROSIS: ICD-10-CM

## 2025-03-17 LAB
B-HCG UR QL: NEGATIVE
EXPIRATION DATE: NORMAL
INTERNAL NEGATIVE CONTROL: NORMAL
INTERNAL POSITIVE CONTROL: NORMAL
Lab: NORMAL
POTASSIUM SERPL-SCNC: 3.4 MMOL/L (ref 3.5–5.2)

## 2025-03-17 PROCEDURE — 25010000002 FENTANYL CITRATE (PF) 50 MCG/ML SOLUTION

## 2025-03-17 PROCEDURE — 25010000002 ONDANSETRON PER 1 MG: Performed by: NURSE ANESTHETIST, CERTIFIED REGISTERED

## 2025-03-17 PROCEDURE — 25010000002 CEFAZOLIN PER 500 MG: Performed by: UROLOGY

## 2025-03-17 PROCEDURE — 25510000001 IOPAMIDOL 61 % SOLUTION: Performed by: UROLOGY

## 2025-03-17 PROCEDURE — C2617 STENT, NON-COR, TEM W/O DEL: HCPCS | Performed by: UROLOGY

## 2025-03-17 PROCEDURE — 76000 FLUOROSCOPY <1 HR PHYS/QHP: CPT

## 2025-03-17 PROCEDURE — 93005 ELECTROCARDIOGRAM TRACING: CPT | Performed by: ANESTHESIOLOGY

## 2025-03-17 PROCEDURE — 25810000003 LACTATED RINGERS PER 1000 ML: Performed by: ANESTHESIOLOGY

## 2025-03-17 PROCEDURE — 25010000002 SUGAMMADEX 200 MG/2ML SOLUTION: Performed by: NURSE ANESTHETIST, CERTIFIED REGISTERED

## 2025-03-17 PROCEDURE — 84132 ASSAY OF SERUM POTASSIUM: CPT | Performed by: ANESTHESIOLOGY

## 2025-03-17 PROCEDURE — C1769 GUIDE WIRE: HCPCS | Performed by: UROLOGY

## 2025-03-17 PROCEDURE — 25010000002 PROPOFOL 10 MG/ML EMULSION: Performed by: NURSE ANESTHETIST, CERTIFIED REGISTERED

## 2025-03-17 PROCEDURE — 25010000002 FENTANYL CITRATE (PF) 100 MCG/2ML SOLUTION: Performed by: NURSE ANESTHETIST, CERTIFIED REGISTERED

## 2025-03-17 PROCEDURE — 25010000002 LIDOCAINE PF 1% 1 % SOLUTION: Performed by: ANESTHESIOLOGY

## 2025-03-17 PROCEDURE — 25010000002 DEXAMETHASONE PER 1 MG: Performed by: NURSE ANESTHETIST, CERTIFIED REGISTERED

## 2025-03-17 PROCEDURE — 52356 CYSTO/URETERO W/LITHOTRIPSY: CPT | Performed by: UROLOGY

## 2025-03-17 PROCEDURE — 25010000002 LIDOCAINE PF 1% 1 % SOLUTION: Performed by: NURSE ANESTHETIST, CERTIFIED REGISTERED

## 2025-03-17 PROCEDURE — 81025 URINE PREGNANCY TEST: CPT | Performed by: ANESTHESIOLOGY

## 2025-03-17 PROCEDURE — 74420 UROGRAPHY RTRGR +-KUB: CPT | Performed by: UROLOGY

## 2025-03-17 DEVICE — URETERAL STENT
Type: IMPLANTABLE DEVICE | Site: URETER | Status: FUNCTIONAL
Brand: PERCUFLEX™ PLUS

## 2025-03-17 RX ORDER — TAMSULOSIN HYDROCHLORIDE 0.4 MG/1
1 CAPSULE ORAL DAILY
Qty: 14 CAPSULE | Refills: 0 | Status: SHIPPED | OUTPATIENT
Start: 2025-03-17 | End: 2025-03-31

## 2025-03-17 RX ORDER — PHENAZOPYRIDINE HYDROCHLORIDE 200 MG/1
200 TABLET, FILM COATED ORAL 3 TIMES DAILY PRN
Qty: 20 TABLET | Refills: 0 | Status: SHIPPED | OUTPATIENT
Start: 2025-03-17

## 2025-03-17 RX ORDER — ROCURONIUM BROMIDE 10 MG/ML
INJECTION, SOLUTION INTRAVENOUS AS NEEDED
Status: DISCONTINUED | OUTPATIENT
Start: 2025-03-17 | End: 2025-03-17 | Stop reason: SURG

## 2025-03-17 RX ORDER — ONDANSETRON 2 MG/ML
INJECTION INTRAMUSCULAR; INTRAVENOUS AS NEEDED
Status: DISCONTINUED | OUTPATIENT
Start: 2025-03-17 | End: 2025-03-17 | Stop reason: SURG

## 2025-03-17 RX ORDER — ONDANSETRON 2 MG/ML
4 INJECTION INTRAMUSCULAR; INTRAVENOUS ONCE AS NEEDED
Status: DISCONTINUED | OUTPATIENT
Start: 2025-03-17 | End: 2025-03-17 | Stop reason: HOSPADM

## 2025-03-17 RX ORDER — MIDAZOLAM HYDROCHLORIDE 1 MG/ML
1 INJECTION, SOLUTION INTRAMUSCULAR; INTRAVENOUS
Status: DISCONTINUED | OUTPATIENT
Start: 2025-03-17 | End: 2025-03-17 | Stop reason: HOSPADM

## 2025-03-17 RX ORDER — SODIUM CHLORIDE, SODIUM LACTATE, POTASSIUM CHLORIDE, CALCIUM CHLORIDE 600; 310; 30; 20 MG/100ML; MG/100ML; MG/100ML; MG/100ML
9 INJECTION, SOLUTION INTRAVENOUS CONTINUOUS
Status: DISCONTINUED | OUTPATIENT
Start: 2025-03-18 | End: 2025-03-17 | Stop reason: HOSPADM

## 2025-03-17 RX ORDER — FAMOTIDINE 10 MG/ML
20 INJECTION, SOLUTION INTRAVENOUS ONCE
Status: DISCONTINUED | OUTPATIENT
Start: 2025-03-17 | End: 2025-03-17 | Stop reason: HOSPADM

## 2025-03-17 RX ORDER — FENTANYL CITRATE 50 UG/ML
INJECTION, SOLUTION INTRAMUSCULAR; INTRAVENOUS AS NEEDED
Status: DISCONTINUED | OUTPATIENT
Start: 2025-03-17 | End: 2025-03-17 | Stop reason: SURG

## 2025-03-17 RX ORDER — LIDOCAINE HYDROCHLORIDE 10 MG/ML
INJECTION, SOLUTION EPIDURAL; INFILTRATION; INTRACAUDAL; PERINEURAL AS NEEDED
Status: DISCONTINUED | OUTPATIENT
Start: 2025-03-17 | End: 2025-03-17 | Stop reason: SURG

## 2025-03-17 RX ORDER — SODIUM CHLORIDE 0.9 % (FLUSH) 0.9 %
10 SYRINGE (ML) INJECTION AS NEEDED
Status: DISCONTINUED | OUTPATIENT
Start: 2025-03-17 | End: 2025-03-17 | Stop reason: HOSPADM

## 2025-03-17 RX ORDER — IOPAMIDOL 612 MG/ML
INJECTION, SOLUTION INTRAVASCULAR AS NEEDED
Status: DISCONTINUED | OUTPATIENT
Start: 2025-03-17 | End: 2025-03-17 | Stop reason: HOSPADM

## 2025-03-17 RX ORDER — ACETAMINOPHEN 500 MG
1000 TABLET ORAL ONCE
Status: COMPLETED | OUTPATIENT
Start: 2025-03-17 | End: 2025-03-17

## 2025-03-17 RX ORDER — HYDROMORPHONE HYDROCHLORIDE 1 MG/ML
0.5 INJECTION, SOLUTION INTRAMUSCULAR; INTRAVENOUS; SUBCUTANEOUS
Status: DISCONTINUED | OUTPATIENT
Start: 2025-03-17 | End: 2025-03-17 | Stop reason: HOSPADM

## 2025-03-17 RX ORDER — PROPOFOL 10 MG/ML
VIAL (ML) INTRAVENOUS AS NEEDED
Status: DISCONTINUED | OUTPATIENT
Start: 2025-03-17 | End: 2025-03-17 | Stop reason: SURG

## 2025-03-17 RX ORDER — KETOROLAC TROMETHAMINE 10 MG/1
10 TABLET, FILM COATED ORAL EVERY 6 HOURS PRN
Qty: 15 TABLET | Refills: 0 | Status: SHIPPED | OUTPATIENT
Start: 2025-03-17

## 2025-03-17 RX ORDER — FENTANYL CITRATE 50 UG/ML
50 INJECTION, SOLUTION INTRAMUSCULAR; INTRAVENOUS
Status: DISCONTINUED | OUTPATIENT
Start: 2025-03-17 | End: 2025-03-17 | Stop reason: HOSPADM

## 2025-03-17 RX ORDER — SCOPOLAMINE 1 MG/3D
1 PATCH, EXTENDED RELEASE TRANSDERMAL CONTINUOUS
Status: DISCONTINUED | OUTPATIENT
Start: 2025-03-17 | End: 2025-03-17 | Stop reason: HOSPADM

## 2025-03-17 RX ORDER — OXYBUTYNIN CHLORIDE 5 MG/1
5 TABLET, EXTENDED RELEASE ORAL DAILY
Qty: 14 TABLET | Refills: 0 | Status: SHIPPED | OUTPATIENT
Start: 2025-03-17

## 2025-03-17 RX ORDER — GABAPENTIN 300 MG/1
600 CAPSULE ORAL ONCE
Status: COMPLETED | OUTPATIENT
Start: 2025-03-17 | End: 2025-03-17

## 2025-03-17 RX ORDER — FAMOTIDINE 20 MG/1
20 TABLET, FILM COATED ORAL ONCE
Status: COMPLETED | OUTPATIENT
Start: 2025-03-17 | End: 2025-03-17

## 2025-03-17 RX ORDER — LIDOCAINE HYDROCHLORIDE 10 MG/ML
0.5 INJECTION, SOLUTION EPIDURAL; INFILTRATION; INTRACAUDAL; PERINEURAL ONCE AS NEEDED
Status: COMPLETED | OUTPATIENT
Start: 2025-03-17 | End: 2025-03-17

## 2025-03-17 RX ORDER — MELOXICAM 15 MG/1
15 TABLET ORAL ONCE
Status: COMPLETED | OUTPATIENT
Start: 2025-03-17 | End: 2025-03-17

## 2025-03-17 RX ORDER — FENTANYL CITRATE 50 UG/ML
INJECTION, SOLUTION INTRAMUSCULAR; INTRAVENOUS
Status: COMPLETED
Start: 2025-03-17 | End: 2025-03-17

## 2025-03-17 RX ORDER — DEXAMETHASONE SODIUM PHOSPHATE 4 MG/ML
INJECTION, SOLUTION INTRA-ARTICULAR; INTRALESIONAL; INTRAMUSCULAR; INTRAVENOUS; SOFT TISSUE AS NEEDED
Status: DISCONTINUED | OUTPATIENT
Start: 2025-03-17 | End: 2025-03-17 | Stop reason: SURG

## 2025-03-17 RX ORDER — NITROFURANTOIN 25; 75 MG/1; MG/1
100 CAPSULE ORAL 2 TIMES DAILY
Qty: 14 CAPSULE | Refills: 0 | Status: SHIPPED | OUTPATIENT
Start: 2025-03-17

## 2025-03-17 RX ADMIN — DEXAMETHASONE SODIUM PHOSPHATE 4 MG: 4 INJECTION INTRA-ARTICULAR; INTRALESIONAL; INTRAMUSCULAR; INTRAVENOUS; SOFT TISSUE at 14:31

## 2025-03-17 RX ADMIN — SODIUM CHLORIDE 2000 MG: 900 INJECTION INTRAVENOUS at 14:34

## 2025-03-17 RX ADMIN — SODIUM CHLORIDE, POTASSIUM CHLORIDE, SODIUM LACTATE AND CALCIUM CHLORIDE 9 ML/HR: 600; 310; 30; 20 INJECTION, SOLUTION INTRAVENOUS at 12:34

## 2025-03-17 RX ADMIN — MELOXICAM 15 MG: 15 TABLET ORAL at 12:32

## 2025-03-17 RX ADMIN — ACETAMINOPHEN 1000 MG: 500 TABLET ORAL at 12:32

## 2025-03-17 RX ADMIN — GABAPENTIN 600 MG: 300 CAPSULE ORAL at 12:32

## 2025-03-17 RX ADMIN — PROPOFOL 200 MG: 10 INJECTION, EMULSION INTRAVENOUS at 14:31

## 2025-03-17 RX ADMIN — FAMOTIDINE 20 MG: 20 TABLET, FILM COATED ORAL at 12:32

## 2025-03-17 RX ADMIN — FENTANYL CITRATE 100 MCG: 50 INJECTION, SOLUTION INTRAMUSCULAR; INTRAVENOUS at 14:31

## 2025-03-17 RX ADMIN — PROPOFOL 50 MG: 10 INJECTION, EMULSION INTRAVENOUS at 14:34

## 2025-03-17 RX ADMIN — FENTANYL CITRATE 50 MCG: 50 INJECTION, SOLUTION INTRAMUSCULAR; INTRAVENOUS at 15:47

## 2025-03-17 RX ADMIN — SCOPOLAMINE 1 PATCH: 1.5 PATCH, EXTENDED RELEASE TRANSDERMAL at 12:33

## 2025-03-17 RX ADMIN — LIDOCAINE HYDROCHLORIDE 50 MG: 10 INJECTION, SOLUTION EPIDURAL; INFILTRATION; INTRACAUDAL; PERINEURAL at 14:31

## 2025-03-17 RX ADMIN — LIDOCAINE HYDROCHLORIDE 0.5 ML: 10 INJECTION, SOLUTION EPIDURAL; INFILTRATION; INTRACAUDAL; PERINEURAL at 12:32

## 2025-03-17 RX ADMIN — ROCURONIUM BROMIDE 50 MG: 10 INJECTION INTRAVENOUS at 14:31

## 2025-03-17 RX ADMIN — ONDANSETRON 4 MG: 2 INJECTION INTRAMUSCULAR; INTRAVENOUS at 14:31

## 2025-03-17 RX ADMIN — SUGAMMADEX 200 MG: 100 INJECTION, SOLUTION INTRAVENOUS at 15:10

## 2025-03-17 NOTE — ANESTHESIA PREPROCEDURE EVALUATION
Anesthesia Evaluation     Patient summary reviewed and Nursing notes reviewed   no history of anesthetic complications:   NPO Solid Status: > 8 hours  NPO Liquid Status: > 2 hours           Airway   Mallampati: I  TM distance: >3 FB  Neck ROM: full  No difficulty expected  Dental - normal exam     Pulmonary    (+) a smoker,  (-) asthma, recent URI, sleep apnea  Cardiovascular     ECG reviewed    (-) dysrhythmias, angina, cardiac stents      Neuro/Psych  (-) seizures, CVA  GI/Hepatic/Renal/Endo    (+) morbid obesity, renal disease- stones  (-) liver disease, diabetes, no thyroid disorder    Musculoskeletal     Abdominal    Substance History      OB/GYN          Other                      Anesthesia Plan    ASA 3     general     intravenous induction     Anesthetic plan, risks, benefits, and alternatives have been provided, discussed and informed consent has been obtained with: patient.  Pre-procedure education provided  Plan discussed with CRNA.    CODE STATUS:

## 2025-03-17 NOTE — OP NOTE
OPERATIVE REPORT     Patient Name:  Alla Selby  YOB: 1980    Patient MRN: [id number]    Date of Surgery:  3/17/25     Indications:  44 yo female with a history of right renal  stone.  We have discussed the indication for definitive stone treatment.  We have discussed the risks and benefits of ureteroscopy and laser lithotripsy with stent placement.  Patient elects to proceed.    Pre-op Diagnosis:   Right Renal Stone    Post-op Diagnosis:   Right Renal  Stone    Procedures Performed:  CYSTOSCOPY  RIGHT URETEROSCOPY, LASER LITHOTRIPSY  RIGHT RETROGRADE PYELOGRAM  RIGHT URETERAL STENT PLACEMENT    Staff:  Fab Zamorano MD    Anesthesia: General    Estimated Blood Loss: Minimal    Implants:  4.8F x 26cm ureteral stent     Specimen:  stone    Findings:   Normal urinary bladder  Right ureteroscopy with clearance of distal mid proximal ureter.  Right pyeloscopy with seven 8 mm stone within the renal pelvis.  Laser lithotripsy with complete dusting of stone.  Right retrograde pyelography with mild dilation of collecting system  Right ureteral stent placement    Complications: none immediate     Description of Procedure:    After informed consent, the patient was brought back to the operating suite and moved over to the operating table. General anesthesia was smoothly induced, IV antibiotics were administered, and the patient was placed in the dorsal lithotomy position with careful attention focused on padding all pressure points. The patient was prepped and draped in standard fashion. A timeout was performed to ensure the correct patient and procedure    A 22Fr cystoscope was used to cannulate the urethra. The urethra was of normal course and caliber.  Upon entering the bladder, pan-cystoscopy revealed no bladder abnormalities.  The bilateral ureteral orifices were visualized in their orthotopic positions.     A sensor wire was advanced up the right ureter and into the collecting system on  fluoroscopy to serve as a safety wire which was clamped to the surgical drapes.     SEMIRIGID URETEROSCOPY  The semi-rigid ureteroscope was then advanced into the bladder. The right ureter was cannulated demonstrating no evidence of ureteral stone.  A second sensor wire was advanced into the kidney through the scope and the scope was backed out.     NO ACCESS SHEATH  We then switched to pressure-bag irrigation and a flexible ureteroscope was advanced over the second guidewire into the renal pelvis under live fluoroscopy and the second guidewire was removed. Pan-pyeloscopy was then performed which revealed seven 8 mm stone in the renal pelvis.. A 200 micron Thulium laser fiber was then advanced through the ureteroscope. The stones were fragmented into tiny stone dust particles. Pan pyeloscopy was then performed which confirmed no significant stone fragments. The ureter was inspected as the flexible ureteroscope was removed and found to be free of any injuries or stone.      Right Retrograde Pyelogram Interpretation  A retrograde pyelogram was performed demonstrating normal course and caliber of the ureter, outlining the location of the renal pelvis and collecting system, with mild dilation.     CYSTO PLACEMENT  We switched back to the rigid cystoscope which was reinserted over the existing guidewire. A 4.8F x 26cm double J ureteral stent was advanced up the right ureter under direct visualization which confirmed good curl in the bladder. Fluoroscopy confirmed good curl in the kidney. The bladder was drained and this concluded our procedure.      The patient was brought back to the PACU in stable condition. All scopes and instruments were in good working order at the end of the case. There were no complications.    Disposition/Follow Up: Patient will be discharged when meeting appropriate PACU criteria.  The patient will follow-up in 5 to 7 days for cystoscopy and stent removal.    Fab Zamorano MD     Date:  3/17/2025  Time: 15:17 EDT

## 2025-03-17 NOTE — H&P
Fab Zamorano MD   Physician  Specialty: Urology     Progress Notes     Signed     Encounter Date: 3/14/2025   Related encounter: Office Visit from 3/14/2025 in [facility] with Fab Zamorano MD     Signed       Expand All Collapse All[]Expand All by Default                       Office Note Kidney Stone       Patient Name: Alla Selby  : 1980   MRN: [id number]      Chief Complaint: History of Nephrolithiasis/Ureterolithiasis      Referring Provider: Neo El,*     History of Present Illness: Alla Selby is a 45 y.o. female who presents today for evaluation secondary to acute stone.  She has had multiple months with intermittent right flank pain.  Over the past few weeks she has had more consistent flank pain.  She underwent a CT scan 3/5/2025 which demonstrated 7 to 8 mm right proximal ureteral stone.  Today she is reporting worsened flank pain.  Denies lower urinary tract symptoms.  Denies dysuria or hematuria.  Denies prior history of stone disease or intervention for stone        Stone related history  Family history of stones:                                 no  Renal disease or anatomic abnormality:        no  Malabsorptive disease or gastric bypass:      no  Frequent UTI's                                                no  Parathyroid disease                                        no           Subjective      Review of System: Review of Systems   Genitourinary:  Negative for decreased urine volume, difficulty urinating, dysuria, enuresis, flank pain, frequency, hematuria and urgency.         I have reviewed the ROS documented by my clinical staff, updated as appropriate and I agree. Fab Zamorano MD     Past Medical History:   Medical History        Past Medical History:   Diagnosis Date    H/O chlamydia infection 2001    History of anxiety 2017            Past Surgical History:   Surgical History         Past Surgical History:    Procedure Laterality Date    GUM SURGERY   2001    WISDOM TOOTH EXTRACTION   2000            Family History:         Family History   Problem Relation Age of Onset    Breast cancer Maternal Grandmother           70    Ovarian cancer Mother      Colon cancer Neg Hx           Social History:   Social History   Social History            Socioeconomic History    Marital status: Single   Tobacco Use    Smoking status: Every Day       Current packs/day: 0.50       Average packs/day: 0.5 packs/day for 26.2 years (13.1 ttl pk-yrs)       Types: Cigarettes       Start date: 1999       Passive exposure: Current    Smokeless tobacco: Never   Substance and Sexual Activity    Alcohol use: Yes       Comment: Social ETOH use     Drug use: No    Sexual activity: Yes       Partners: Male            Medications:     Current Medications      Current Outpatient Medications:     atorvastatin (LIPITOR) 20 MG tablet, take 1 tablet by mouth every day at bedtime for 90 days, Disp: , Rfl:     diazePAM (VALIUM) 5 MG tablet, Take 1 tablet by mouth Daily., Disp: , Rfl:     Etonogestrel (NEXPLANON SC), Nexplanon, Disp: , Rfl:     HYDROcodone-acetaminophen (NORCO) 5-325 MG per tablet, , Disp: , Rfl:     lamoTRIgine (LaMICtal) 25 MG tablet, Take 3 tablets by mouth every night at bedtime., Disp: , Rfl:     lansoprazole (PREVACID) 30 MG capsule, Take 1 capsule by mouth Daily., Disp: 30 capsule, Rfl: 5    lisinopril-hydrochlorothiazide (PRINZIDE,ZESTORETIC) 10-12.5 MG per tablet, , Disp: , Rfl:     tamsulosin (FLOMAX) 0.4 MG capsule 24 hr capsule, , Disp: , Rfl:     albuterol sulfate  (90 Base) MCG/ACT inhaler, 1 INHALATION INHALED EVERY 4 HOURS, Disp: , Rfl:     azithromycin (Zithromax Z-Brandon) 250 MG tablet, Take 2 tablets by mouth on day 1, then 1 tablet daily on days 2-5, Disp: 6 tablet, Rfl: 0    promethazine-dextromethorphan (PROMETHAZINE-DM) 6.25-15 MG/5ML syrup, Take 5 mL by mouth At Night As Needed for Cough., Disp: 100 mL, Rfl: 0       "  Allergies:   Allergies   No Known Allergies        Objective      Physical Exam:   Vital Signs:   Vitals       Vitals:     03/14/25 1211   Weight: 110 kg (243 lb)   Height: 162.6 cm (64.02\")         Body mass index is 41.69 kg/m².      Physical Exam  Vitals and nursing note reviewed.   Constitutional:       Appearance: Normal appearance.   HENT:      Head: Normocephalic and atraumatic.   Cardiovascular:      Comments: Well perfused  Pulmonary:      Effort: Pulmonary effort is normal.   Abdominal:      General: Abdomen is flat.      Palpations: Abdomen is soft.   Musculoskeletal:         General: Normal range of motion.   Skin:     General: Skin is warm and dry.   Neurological:      General: No focal deficit present.      Mental Status: She is alert and oriented to person, place, and time. Mental status is at baseline.   Psychiatric:         Mood and Affect: Mood normal.         Behavior: Behavior normal.         Thought Content: Thought content normal.         Judgment: Judgment normal.            Labs:   Brief Urine Lab Results         None                Urine Culture Results:            No results found for: \"GLUCOSE\", \"CALCIUM\", \"NA\", \"K\", \"CO2\", \"CL\", \"BUN\", \"CREATININE\", \"EGFRIFAFRI\", \"EGFRIFNONA\", \"BCR\", \"ANIONGAP\"     No results found for: \"WBC\", \"HGB\", \"HCT\", \"MCV\", \"PLT\"        Images:   No Images in the past 120 days found..     Measures:   Tobacco:   Alla Selby  reports that she has been smoking cigarettes. She started smoking about 26 years ago. She has a 13.1 pack-year smoking history. She has been exposed to tobacco smoke. She has never used smokeless tobacco. I have educated her on the risk of diseases from using tobacco product.      Assessment / Plan       Assessment/Plan:   Alla Selby is a 45 y.o. female who presented today with nephrolithiasis/ureterolithiasis.  Patient has been identified to have 7 to 8 mm obstructing right ureteral stone.  She is having significant flank pain, " consistent flank pain.  Given continued presence of flank pain, inability to pass stone over a multi week history we have discussed the indication for intervention.  We have discussed the risks and benefits, indication for ureteroscopy and laser lithotripsy.  We have discussed periprocedural postoperative expectation including ureteral stent placement.  We will coordinate and schedule 3/17/2025     Diagnoses and all orders for this visit:     1. Ureteral stone with hydronephrosis (Primary)  -     acetaminophen (TYLENOL) tablet 1,000 mg  -     gabapentin (NEURONTIN) capsule 600 mg  -     meloxicam (MOBIC) tablet 15 mg  -     scopolamine patch 1 mg/72 hr  -     Case Request; Standing  -     ceFAZolin (ANCEF) 2,000 mg in sodium chloride 0.9 % 100 mL IVPB  -     Case Request     Other orders  -     Follow Anesthesia Guidelines / Protocol; Future  -     Follow Anesthesia Guidelines / Protocol; Standing  -     Provide Patient With Enhanced Recovery Booklet(s) OR Handout; Standing  -     Verify NPO Status; Standing  -     Verify the Time Patient Completed G[place]ade / G2; Standing  -     Place Sequential Compression Device; Standing  -     Maintain Sequential Compression Device; Standing              I spent approximately 45 minutes providing clinical care for this patient; including review of patient's chart and provider documentation, face to face time spent with patient in examination room (obtaining history, performing physical exam, discussing diagnosis and management options), placing orders, and completing patient documentation.      Fab Zamorano MD  [facility] [place]              Revision History     [facility] Pre-op    Full history and physical note from office is up to date.     There were no vitals taken for this visit.  Patient denies allergy to contrast dye or latex  IMM:  Influenza: No  Pneumococcal: No  Tetanus: Up-to-date  Lungs: Clear to auscultation bilaterally bases  Cardiovascular:  "S1-S2 without rubs murmurs or gallops  Abdomen: Soft, nontender, bowel sounds present throughout.    LAB Results:  No results found for: \"WBC\", \"HGB\", \"HCT\", \"MCV\", \"PLT\", \"NEUTROABS\", \"GLUCOSE\", \"BUN\", \"CREATININE\", \"EGFRIFNONA\", \"EGFRIFAFRI\", \"NA\", \"K\", \"CL\", \"CO2\", \"MG\", \"PHOS\", \"CALCIUM\", \"ALBUMIN\", \"AST\", \"ALT\", \"BILITOT\", \"PTT\", \"INR\"    Cancer Staging (if applicable)  Cancer Patient: __ yes __no __unknown__N/A; If yes, clinical stage T:__ N:__M:__, stage group or __N/A  Impression: Ureteral stone with hydronephrosis  Overweight  Plan: Uteroscopy laser lithotripsy with stent insertion right  NIKITA Garibay   03/17/25   11:46 AM EDT   "

## 2025-03-17 NOTE — ANESTHESIA POSTPROCEDURE EVALUATION
Patient: Alla Selby    Procedure Summary       Date: 03/17/25 Room / Location:  [facility] OR 07 /  [facility]    Anesthesia Start: 1415 Anesthesia Stop: 1519    Procedure: URETEROSCOPY LASER LITHOTRIPSY WITH STENT INSERTION (Right: Ureter) Diagnosis:       Ureteral stone with hydronephrosis      (Ureteral stone with hydronephrosis [N13.2])    Surgeons: Fab Zamorano MD Provider: Taz Trejo MD    Anesthesia Type: general ASA Status: 3            Anesthesia Type: general    Vitals  Vitals Value Taken Time   BP     Temp     Pulse 115 03/17/25 15:18   Resp     SpO2 94 % 03/17/25 15:18   Vitals shown include unfiled device data.        Post Anesthesia Care and Evaluation    Patient location during evaluation: PACU  Patient participation: complete - patient participated  Level of consciousness: awake and alert  Pain management: adequate    Airway patency: patent  Anesthetic complications: No anesthetic complications  PONV Status: none  Cardiovascular status: hemodynamically stable and acceptable  Respiratory status: nonlabored ventilation, acceptable and nasal cannula  Hydration status: acceptable    Comments: 146/97 97.8 [id number]

## 2025-03-17 NOTE — ANESTHESIA PROCEDURE NOTES
Airway  Reason: elective    Date/Time: 3/17/2025 2:36 PM  Airway not difficult    General Information and Staff    Patient location during procedure: OR  CRNA/CAA: Junior MICHELLE Giles, CRNA    Indications and Patient Condition  Indications for airway management: airway protection    Preoxygenated: yes  MILS not maintained throughout    Mask difficulty assessment: 1 - vent by mask    Final Airway Details    Final airway type: endotracheal airway      Successful airway: ETT  Cuffed: yes   Successful intubation technique: direct laryngoscopy  Endotracheal tube insertion site: oral  Blade: Romario  Blade size: 3  ETT size (mm): 7.0  Cormack-Lehane Classification: grade I - full view of glottis  Placement verified by: chest auscultation and capnometry   Measured from: lips  ETT/EBT  to lips (cm): 20  Number of attempts at approach: 1  Assessment: lips, teeth, and gum same as pre-op and atraumatic intubation    Additional Comments  Negative epigastric sounds, Breath sound equal bilaterally with symmetric chest rise and fall

## 2025-03-18 ENCOUNTER — TELEPHONE (OUTPATIENT)
Dept: UROLOGY | Facility: CLINIC | Age: 45
End: 2025-03-18
Payer: COMMERCIAL

## 2025-03-18 NOTE — TELEPHONE ENCOUNTER
----- Message from Fab Zamorano sent at 3/17/2025  3:16 PM EDT -----  Cysto/stent removal in clinic on friday

## 2025-03-19 LAB
QT INTERVAL: 390 MS
QTC INTERVAL: 474 MS

## 2025-03-21 ENCOUNTER — TELEPHONE (OUTPATIENT)
Dept: UROLOGY | Facility: CLINIC | Age: 45
End: 2025-03-21

## 2025-03-21 ENCOUNTER — PROCEDURE VISIT (OUTPATIENT)
Dept: UROLOGY | Facility: CLINIC | Age: 45
End: 2025-03-21
Payer: COMMERCIAL

## 2025-03-21 VITALS — HEIGHT: 64 IN | WEIGHT: 242 LBS | BODY MASS INDEX: 41.32 KG/M2

## 2025-03-21 DIAGNOSIS — N13.2 URETERAL STONE WITH HYDRONEPHROSIS: Primary | ICD-10-CM

## 2025-03-21 NOTE — TELEPHONE ENCOUNTER
Pt called the clinic after getting home from having her stent removed this morning. She states that she is in severe pain, and has thrown up 3 times already. She also feels like she needs to urinate and cannot. I consulted Dr. Zamorano and he told me to let the pt know this is pretty typical after a stent removal, that she needs to rest, drink plenty of fluids and monitor symptoms. If they continue to become worse she needs to go to the emergency room. I let the pt know and she verbalized understanding.

## 2025-03-21 NOTE — PROGRESS NOTES
Procedure: Flexible Cystoscopy with Stent Removal     Preoperative Diagnosis: Right Renal Stone    Postoperative Diagnosis: Right Renal  Stone    Procedure performed: Cystoscopy with ureteral stent removal     Surgeon: Fab Zamorano MD    Anesthesia: 2% Lidocaine Jelly    Indications: Alla Selby is a 45 y.o. year old female with a history of Right Renal  stone who underwent definitive stone treatment. A ureteral stent was left in place. The patient returns for planned ureteral stent removal today.     Procedure: Patient was taken to the urology procedure suite and prepped and draped in sterile fashion. A pre-procedural identification was performed. 10 cc of 2% lidocaine jelly was injected into the urethra. After adequate anesthesia, a lubricated flexible cystoscope was inserted into the urethra. The urethra appeared normal. The urinary sphincter was intact. The bladder was entered and 360 degree panendoscopy was performed revealing normal bladder anatomy, bilateral orthotopic ureteral orifices, and no concern for bladder stones, trabeculations, mucosal lesions/tumors, or divetrticuli. The ureteral stent was in good position emanating from the ureteral orifice.      The ureteral stent was grasped with a pair of grasping forceps and was removed without difficulty. The stent was removed intact.     PLAN    1) Discharge home    2) Follow up: 4 week with Renal US

## 2025-03-23 ENCOUNTER — HOSPITAL ENCOUNTER (EMERGENCY)
Facility: HOSPITAL | Age: 45
Discharge: HOME | End: 2025-03-23
Attending: EMERGENCY MEDICINE | Admitting: EMERGENCY MEDICINE
Payer: COMMERCIAL

## 2025-03-23 ENCOUNTER — APPOINTMENT (OUTPATIENT)
Dept: CT IMAGING | Facility: HOSPITAL | Age: 45
End: 2025-03-23
Payer: COMMERCIAL

## 2025-03-23 VITALS
HEART RATE: 94 BPM | SYSTOLIC BLOOD PRESSURE: 134 MMHG | DIASTOLIC BLOOD PRESSURE: 82 MMHG | OXYGEN SATURATION: 95 % | BODY MASS INDEX: 40.12 KG/M2 | TEMPERATURE: 98 F | WEIGHT: 235 LBS | HEIGHT: 64 IN | RESPIRATION RATE: 18 BRPM

## 2025-03-23 DIAGNOSIS — R10.9 RIGHT FLANK PAIN: Primary | ICD-10-CM

## 2025-03-23 DIAGNOSIS — N12 PYELONEPHRITIS: ICD-10-CM

## 2025-03-23 DIAGNOSIS — Z98.890 HISTORY OF REMOVAL OF URETERAL STENT: ICD-10-CM

## 2025-03-23 LAB
ALBUMIN SERPL-MCNC: 4.2 G/DL (ref 3.5–5.2)
ALBUMIN/GLOB SERPL: 1.6 G/DL
ALP SERPL-CCNC: 62 U/L (ref 39–117)
ALT SERPL W P-5'-P-CCNC: 21 U/L (ref 1–33)
ANION GAP SERPL CALCULATED.3IONS-SCNC: 17 MMOL/L (ref 5–15)
AST SERPL-CCNC: 20 U/L (ref 1–32)
B-HCG UR QL: NEGATIVE
BACTERIA UR QL AUTO: ABNORMAL /HPF
BASOPHILS # BLD AUTO: 0.08 10*3/MM3 (ref 0–0.2)
BASOPHILS NFR BLD AUTO: 0.7 % (ref 0–1.5)
BILIRUB SERPL-MCNC: 0.5 MG/DL (ref 0–1.2)
BILIRUB UR QL STRIP: ABNORMAL
BUN SERPL-MCNC: 11 MG/DL (ref 6–20)
BUN/CREAT SERPL: 16.2 (ref 7–25)
CALCIUM SPEC-SCNC: 9.1 MG/DL (ref 8.6–10.5)
CHLORIDE SERPL-SCNC: 98 MMOL/L (ref 98–107)
CLARITY UR: ABNORMAL
CO2 SERPL-SCNC: 23 MMOL/L (ref 22–29)
COLOR UR: ABNORMAL
CREAT SERPL-MCNC: 0.68 MG/DL (ref 0.57–1)
DEPRECATED RDW RBC AUTO: 42.6 FL (ref 37–54)
EGFRCR SERPLBLD CKD-EPI 2021: 111 ML/MIN/1.73
EOSINOPHIL # BLD AUTO: 0.16 10*3/MM3 (ref 0–0.4)
EOSINOPHIL NFR BLD AUTO: 1.4 % (ref 0.3–6.2)
ERYTHROCYTE [DISTWIDTH] IN BLOOD BY AUTOMATED COUNT: 13.8 % (ref 12.3–15.4)
GLOBULIN UR ELPH-MCNC: 2.7 GM/DL
GLUCOSE SERPL-MCNC: 87 MG/DL (ref 65–99)
GLUCOSE UR STRIP-MCNC: NEGATIVE MG/DL
HCT VFR BLD AUTO: 46.5 % (ref 34–46.6)
HGB BLD-MCNC: 15.2 G/DL (ref 12–15.9)
HGB UR QL STRIP.AUTO: ABNORMAL
IMM GRANULOCYTES # BLD AUTO: 0.04 10*3/MM3 (ref 0–0.05)
IMM GRANULOCYTES NFR BLD AUTO: 0.4 % (ref 0–0.5)
KETONES UR QL STRIP: ABNORMAL
LEUKOCYTE ESTERASE UR QL STRIP.AUTO: ABNORMAL
LYMPHOCYTES # BLD AUTO: 1.75 10*3/MM3 (ref 0.7–3.1)
LYMPHOCYTES NFR BLD AUTO: 15.6 % (ref 19.6–45.3)
MCH RBC QN AUTO: 27.9 PG (ref 26.6–33)
MCHC RBC AUTO-ENTMCNC: 32.7 G/DL (ref 31.5–35.7)
MCV RBC AUTO: 85.5 FL (ref 79–97)
MONOCYTES # BLD AUTO: 0.93 10*3/MM3 (ref 0.1–0.9)
MONOCYTES NFR BLD AUTO: 8.3 % (ref 5–12)
NEUTROPHILS NFR BLD AUTO: 73.6 % (ref 42.7–76)
NEUTROPHILS NFR BLD AUTO: 8.25 10*3/MM3 (ref 1.7–7)
NITRITE UR QL STRIP: POSITIVE
NRBC BLD AUTO-RTO: 0 /100 WBC (ref 0–0.2)
PH UR STRIP.AUTO: 6 [PH] (ref 5–8)
PLATELET # BLD AUTO: 372 10*3/MM3 (ref 140–450)
PMV BLD AUTO: 11.2 FL (ref 6–12)
POTASSIUM SERPL-SCNC: 3.4 MMOL/L (ref 3.5–5.2)
PROT SERPL-MCNC: 6.9 G/DL (ref 6–8.5)
PROT UR QL STRIP: ABNORMAL
RBC # BLD AUTO: 5.44 10*6/MM3 (ref 3.77–5.28)
RBC # UR STRIP: ABNORMAL /HPF
REF LAB TEST METHOD: ABNORMAL
SODIUM SERPL-SCNC: 138 MMOL/L (ref 136–145)
SP GR UR STRIP: 1.02 (ref 1–1.03)
SQUAMOUS #/AREA URNS HPF: ABNORMAL /HPF
UROBILINOGEN UR QL STRIP: ABNORMAL
WBC # UR STRIP: ABNORMAL /HPF
WBC NRBC COR # BLD AUTO: 11.21 10*3/MM3 (ref 3.4–10.8)

## 2025-03-23 PROCEDURE — 25810000003 LACTATED RINGERS SOLUTION: Performed by: EMERGENCY MEDICINE

## 2025-03-23 PROCEDURE — 81001 URINALYSIS AUTO W/SCOPE: CPT | Performed by: EMERGENCY MEDICINE

## 2025-03-23 PROCEDURE — 96365 THER/PROPH/DIAG IV INF INIT: CPT

## 2025-03-23 PROCEDURE — 25010000002 ONDANSETRON PER 1 MG: Performed by: EMERGENCY MEDICINE

## 2025-03-23 PROCEDURE — 25010000002 CEFTRIAXONE PER 250 MG: Performed by: EMERGENCY MEDICINE

## 2025-03-23 PROCEDURE — 74176 CT ABD & PELVIS W/O CONTRAST: CPT

## 2025-03-23 PROCEDURE — 25010000002 MORPHINE PER 10 MG: Performed by: EMERGENCY MEDICINE

## 2025-03-23 PROCEDURE — 85025 COMPLETE CBC W/AUTO DIFF WBC: CPT | Performed by: EMERGENCY MEDICINE

## 2025-03-23 PROCEDURE — 81025 URINE PREGNANCY TEST: CPT | Performed by: EMERGENCY MEDICINE

## 2025-03-23 PROCEDURE — 96375 TX/PRO/DX INJ NEW DRUG ADDON: CPT

## 2025-03-23 PROCEDURE — 99284 EMERGENCY DEPT VISIT MOD MDM: CPT

## 2025-03-23 PROCEDURE — 80053 COMPREHEN METABOLIC PANEL: CPT | Performed by: EMERGENCY MEDICINE

## 2025-03-23 PROCEDURE — 25010000002 KETOROLAC TROMETHAMINE PER 15 MG: Performed by: EMERGENCY MEDICINE

## 2025-03-23 RX ORDER — MORPHINE SULFATE 4 MG/ML
4 INJECTION, SOLUTION INTRAMUSCULAR; INTRAVENOUS ONCE
Status: COMPLETED | OUTPATIENT
Start: 2025-03-23 | End: 2025-03-23

## 2025-03-23 RX ORDER — ONDANSETRON 2 MG/ML
4 INJECTION INTRAMUSCULAR; INTRAVENOUS ONCE
Status: COMPLETED | OUTPATIENT
Start: 2025-03-23 | End: 2025-03-23

## 2025-03-23 RX ORDER — CEFUROXIME AXETIL 500 MG/1
500 TABLET ORAL 2 TIMES DAILY
Qty: 20 TABLET | Refills: 0 | Status: SHIPPED | OUTPATIENT
Start: 2025-03-23 | End: 2025-04-02

## 2025-03-23 RX ORDER — KETOROLAC TROMETHAMINE 15 MG/ML
15 INJECTION, SOLUTION INTRAMUSCULAR; INTRAVENOUS ONCE
Status: COMPLETED | OUTPATIENT
Start: 2025-03-23 | End: 2025-03-23

## 2025-03-23 RX ORDER — POTASSIUM CHLORIDE 750 MG/1
40 CAPSULE, EXTENDED RELEASE ORAL ONCE
Status: COMPLETED | OUTPATIENT
Start: 2025-03-23 | End: 2025-03-23

## 2025-03-23 RX ORDER — SODIUM CHLORIDE 0.9 % (FLUSH) 0.9 %
10 SYRINGE (ML) INJECTION AS NEEDED
Status: DISCONTINUED | OUTPATIENT
Start: 2025-03-23 | End: 2025-03-23 | Stop reason: HOSPADM

## 2025-03-23 RX ADMIN — ONDANSETRON 4 MG: 2 INJECTION INTRAMUSCULAR; INTRAVENOUS at 02:50

## 2025-03-23 RX ADMIN — SODIUM CHLORIDE, SODIUM LACTATE, POTASSIUM CHLORIDE, CALCIUM CHLORIDE 1000 ML: 20; 30; 600; 310 INJECTION, SOLUTION INTRAVENOUS at 03:13

## 2025-03-23 RX ADMIN — MORPHINE SULFATE 4 MG: 4 INJECTION, SOLUTION INTRAMUSCULAR; INTRAVENOUS at 02:50

## 2025-03-23 RX ADMIN — SODIUM CHLORIDE 2000 MG: 900 INJECTION INTRAVENOUS at 04:43

## 2025-03-23 RX ADMIN — KETOROLAC TROMETHAMINE 15 MG: 15 INJECTION, SOLUTION INTRAMUSCULAR; INTRAVENOUS at 03:39

## 2025-03-23 RX ADMIN — POTASSIUM CHLORIDE 40 MEQ: 750 CAPSULE, EXTENDED RELEASE ORAL at 04:43

## 2025-03-23 NOTE — DISCHARGE INSTRUCTIONS
I recommend you take Tylenol 650 mg every 6 hours and ibuprofen 400 mg every 6 hours as needed for pain unless you have a contraindication to these medications  Take prescribed antibiotic Ceftin for treatment of urinary tract infection.  Call to schedule follow-up appointment with Dr. Zamorano.  Return to the ER as needed for new or worsening symptoms

## 2025-03-23 NOTE — ED PROVIDER NOTES
[facility]    EMERGENCY DEPARTMENT ENCOUNTER      Pt Name: Alla Selby  MRN: [id number]  YOB: 1980  Date of evaluation: 3/23/2025  Provider: Geoff Gonzalez MD    CHIEF COMPLAINT       Chief Complaint   Patient presents with    Post-op Problem         HISTORY OF PRESENT ILLNESS   Alla Selby is a 45 y.o. female who presents to the emergency department for evaluation of right-sided flank pain.  Patient states she has had some degree of pain for multiple weeks now since she was diagnosed with a kidney stone.  She had a lithotripsy and a right ureteral stent placed by her urologist Dr. Zamorano.  2 days ago she had her ureteral stent removed and was told to expect that she should start feeling much better however she states things have only worsened.  She has noticed some malodorous urine.  She has not had fevers.  The pain has been excruciating at times though currently it is significantly improved due to taking some Percocet prior to arrival in the ER.  She is also had multiple episodes of nausea and vomiting.    REVIEW OF SYSTEMS     ROS:  A chief complaint appropriate review of systems was completed and is negative except as noted in the HPI.      PAST MEDICAL HISTORY     Past Medical History:   Diagnosis Date    Anxiety 2019    Fibroid 2019    Breasts    H/O chlamydia infection 2001    History of anxiety 2017    Hyperlipidemia 2024    Hypertension 2024    Early 2024 - Lisinopril    Kidney stone 3/5/2025    Liver disease 3/5/25    fatty liver    Trauma     Vaginal infection     Varicella          SURGICAL HISTORY       Past Surgical History:   Procedure Laterality Date    GUM SURGERY  2001    KIDNEY STONE SURGERY  3/17/25    LITHOTRIPSY  3/17/24    URETEROSCOPY LASER LITHOTRIPSY WITH STENT INSERTION Right 03/17/2025    Procedure: URETEROSCOPY LASER LITHOTRIPSY WITH STENT INSERTION RIGHT;  Surgeon: Fab Zamorano MD;  Location: [facility];  Service: Urology;  Laterality: Right;    WISDOM TOOTH  EXTRACTION  2000         CURRENT MEDICATIONS       Current Facility-Administered Medications:     cefTRIAXone (ROCEPHIN) 2,000 mg in sodium chloride 0.9 % 100 mL MBP, 2,000 mg, Intravenous, Once, Geoff Gonzalez MD    potassium chloride (MICRO-K/KLOR-CON) CR capsule, 40 mEq, Oral, Once, Geoff Gonzalez MD    [COMPLETED] Insert Peripheral IV, , , Once **AND** sodium chloride 0.9 % flush 10 mL, 10 mL, Intravenous, PRN, Geoff Gonzalez MD    Current Outpatient Medications:     albuterol sulfate  (90 Base) MCG/ACT inhaler, 1 INHALATION INHALED EVERY 4 HOURS, Disp: , Rfl:     atorvastatin (LIPITOR) 20 MG tablet, take 1 tablet by mouth every day at bedtime for 90 days, Disp: , Rfl:     azithromycin (Zithromax Z-Brandon) 250 MG tablet, Take 2 tablets by mouth on day 1, then 1 tablet daily on days 2-5, Disp: 6 tablet, Rfl: 0    cefuroxime (CEFTIN) 500 MG tablet, Take 1 tablet by mouth 2 (Two) Times a Day for 10 days., Disp: 20 tablet, Rfl: 0    diazePAM (VALIUM) 5 MG tablet, Take 1 tablet by mouth Daily., Disp: , Rfl:     Etonogestrel (NEXPLANON SC), Nexplanon, Disp: , Rfl:     HYDROcodone-acetaminophen (NORCO) 5-325 MG per tablet, , Disp: , Rfl:     ketorolac (TORADOL) 10 MG tablet, Take 1 tablet by mouth Every 6 (Six) Hours As Needed for Moderate Pain., Disp: 15 tablet, Rfl: 0    lamoTRIgine (LaMICtal) 25 MG tablet, Take 3 tablets by mouth every night at bedtime., Disp: , Rfl:     lansoprazole (PREVACID) 30 MG capsule, Take 1 capsule by mouth Daily., Disp: 30 capsule, Rfl: 5    lisinopril-hydrochlorothiazide (PRINZIDE,ZESTORETIC) 10-12.5 MG per tablet, , Disp: , Rfl:     nitrofurantoin, macrocrystal-monohydrate, (Macrobid) 100 MG capsule, Take 1 capsule by mouth 2 (Two) Times a Day., Disp: 14 capsule, Rfl: 0    oxybutynin XL (DITROPAN-XL) 5 MG 24 hr tablet, Take 1 tablet by mouth Daily. PRN BLADDER SPASM, Disp: 14 tablet, Rfl: 0    phenazopyridine (Pyridium) 200 MG tablet, Take 1 tablet by mouth 3 (Three) Times a Day As  Needed for Bladder Spasms., Disp: 20 tablet, Rfl: 0    promethazine-dextromethorphan (PROMETHAZINE-DM) 6.25-15 MG/5ML syrup, Take 5 mL by mouth At Night As Needed for Cough., Disp: 100 mL, Rfl: 0    tamsulosin (FLOMAX) 0.4 MG capsule 24 hr capsule, , Disp: , Rfl:     tamsulosin (FLOMAX) 0.4 MG capsule 24 hr capsule, Take 1 capsule by mouth Daily for 14 days., Disp: 14 capsule, Rfl: 0    ALLERGIES     Patient has no known allergies.    FAMILY HISTORY       Family History   Problem Relation Age of Onset    Breast cancer Maternal Grandmother         70    Cancer Maternal Grandmother         Terminal Breast Cancer    Hypertension Maternal Grandmother     Ovarian cancer Mother     Cancer Maternal Grandfather         Terminal Esophageal Cancer    Colon cancer Neg Hx           SOCIAL HISTORY       Social History     Socioeconomic History    Marital status: Single   Tobacco Use    Smoking status: Former     Current packs/day: 0.50     Average packs/day: 0.5 packs/day for 26.2 years (13.1 ttl pk-yrs)     Types: Cigarettes     Start date:      Passive exposure: Current    Smokeless tobacco: Never    Tobacco comments:     Smoked menthol 1 pack a day from [id number]   Vaping Use    Vaping status: Never Used   Substance and Sexual Activity    Alcohol use: Yes     Alcohol/week: 10.0 standard drinks of alcohol     Types: 4 Glasses of wine, 6 Cans of beer per week     Comment: 6 pack of beer on weekends, or a couple bottles of red wine    Drug use: No    Sexual activity: Yes     Partners: Male     Birth control/protection: Nexplanon     Comment: Nexplanon  25. Appt sched 3/31 to discuss tubal         PHYSICAL EXAM    (up to 7 for level 4, 8 or more for level 5)     Vitals:    25 0200 25 0300 25 0343 25 0400   BP: 142/92 138/85 149/92 125/77   BP Location:       Patient Position:       Pulse: 85 81 77 83   Resp:       Temp:       TempSrc:       SpO2: 95% 98% 96% 94%   Weight:       Height:            Physical Exam  Constitutional:       General: She is not in acute distress.  HENT:      Head: Normocephalic and atraumatic.   Eyes:      Conjunctiva/sclera: Conjunctivae normal.      Pupils: Pupils are equal, round, and reactive to light.   Cardiovascular:      Rate and Rhythm: Normal rate and regular rhythm.      Pulses: Normal pulses.      Heart sounds: No murmur heard.     No gallop.   Pulmonary:      Effort: Pulmonary effort is normal. No respiratory distress.      Breath sounds: No wheezing or rales.   Abdominal:      General: Abdomen is flat. There is no distension.      Tenderness: There is no abdominal tenderness. There is no guarding.   Musculoskeletal:         General: No swelling or deformity. Normal range of motion.   Skin:     General: Skin is warm and dry.      Capillary Refill: Capillary refill takes less than 2 seconds.   Neurological:      General: No focal deficit present.      Mental Status: She is alert and oriented to person, place, and time.   Psychiatric:         Mood and Affect: Mood normal.         Behavior: Behavior normal.            DIAGNOSTIC RESULTS     EKG: All EKGs are interpreted by the Emergency Department Physician who either signs or Co-signs this chart in the absence of a cardiologist.    No orders to display         RADIOLOGY:   [x] Radiologist's Report Reviewed:  CT Abdomen Pelvis Without Contrast   Final Result   Impression:   1.There is moderate right-sided hydronephrosis with asymmetric right-sided periureteric and perinephric stranding. There is no evidence of a right ureteral stone. There are multiple nonobstructing right-sided kidney stones. The appearance of the right    kidney and collecting system could be due to a recently passed stone or, less likely, urinary tract infection.   2.A small amount of gas in the urinary bladder that could be from recent instrumentation or cystitis.   3.46 mm simple appearing left ovarian cyst.                        Electronically  Signed: Sabino Ledesma MD     3/23/2025 3:14 AM EDT     Workstation ID: [id number]          I ordered and independently reviewed the above noted radiographic studies.        LABS:  I independently interpreted all laboratory studies conducted during this ED visit.  The results of these studies can be seen below and my independent interpretation in the ED course      EMERGENCY DEPARTMENT COURSE and DIFFERENTIAL DIAGNOSIS/MDM:   Vitals:  AS OF 04:42 EDT    BP - 125/77  HR - 83  TEMP - 98 °F (36.7 °C) (Oral)  O2 SATS - 94%        Discussion below represents my analysis of pertinent findings related to patient's condition, differential diagnosis, treatment plan and final disposition.      Differential diagnosis:  The differential diagnosis associated with the patient's presentation includes: Urinary tract infection, recurrent kidney stone, pain from inflammatory changes from recent stent placement and removal      Independent interpretations (ECG/rhythm strip/X-ray/US/CT scan): See ED course      Additional sources:  Discussed/obtained information from independent historians:   [x] Spouse: Patient's boyfriend provides some details of HPI   [] Parent:   [] Friend:   [] EMS:   [] Other:    External record review:  3/21/2025 reviewed procedural note from Dr. Zamorano with urology, patient underwent flexible cystoscopy with stent removal, documents intact stent removal      Patient's care impacted by:   [] Diabetes   [] Hypertension   [] Coronary Artery Disease   [] Cancer   [x] Other: Obesity, recent urologic interventions    Care significantly affected by Social Determinants of Health (housing and economic circumstances, unemployment)    [] Yes     [x] No   If yes, Patient's care significantly limited by  Social Determinants of Health including:    [] Inadequate housing    [] Low income    [] Alcoholism and drug addiction in family    [] Problems related to primary support group    [] Unemployment    [] Problems related to  employment    [] Other Social Determinants of Health:     I considered prescription management with:    [] Pain medication:   [] Antiviral:   [x] Antibiotic: Ceftin   [] Other:    Additional orders considered but not ordered:  The following testing was considered but ultimately not selected: N/A    ED Course:    ED Course as of 03/23/25 0442   Sun Mar 23, 2025   0347 CT scan of the abdomen and pelvis independently interpreted by myself demonstrates right sided hydroureteronephrosis with nonobstructing renal stones, small amount of gas in the urinary bladder. [KB]   0441 Laboratory workup independently interpreted by myself demonstrates slight leukocytosis and hypokalemia, urinalysis shows too many to count whites and to me to count reds with trace bacteria [KB]      ED Course User Index  [KB] Geoff Gonzalez MD         Diagnostic lab and imaging studies were conducted.  IV fluids and antiemetics given.    I reviewed patient's diagnostic workup and I did consult with on-call urologist Dr. Roth about these findings.  Suspicion is for pyelonephritis at this time.  Will treat patient with a dose of IV Rocephin in the emergency department and an outpatient course of Ceftin.  Patient's pain is controlled with morphine and ketorolac.    She is not septic and is appropriate for outpatient treatment.    Prior to discharge from the emergency department I discussed with the patient and any family members present the current diagnosis, treatment plan, recommendations regarding follow-up with a primary care doctor or specialist, general emergency room return precautions as well as return precautions specific to their diagnosis and treatment.  The patient/family indicated understanding of these instructions.  Time was allotted to answer questions at that time and throughout the ED course.       PROCEDURES:  Procedures    CRITICAL CARE TIME        CONSULTS   Consulted with on-call urologist    FINAL IMPRESSION      1. Right  flank pain    2. Pyelonephritis    3. History of removal of ureteral stent          DISPOSITION/PLAN     ED Disposition       ED Disposition   Discharge    Condition   Stable    Comment   --                 Comment: Please note this report has been produced using speech recognition software.      Geoff Gonzalez MD  Attending Emergency Physician    Recent Results (from the past 24 hours)   Urinalysis With Microscopic If Indicated (No Culture) - Urine, Clean Catch    Collection Time: 03/23/25  2:16 AM    Specimen: Urine, Clean Catch   Result Value Ref Range    Color, UA Dark Yellow (A) Yellow, Straw    Appearance, UA Turbid (A) Clear    pH, UA 6.0 5.0 - 8.0    Specific Gravity, UA 1.022 1.001 - 1.030    Glucose, UA Negative Negative    Ketones, UA 40 mg/dL (2+) (A) Negative    Bilirubin, UA Small (1+) (A) Negative    Blood, UA Large (3+) (A) Negative    Protein,  mg/dL (2+) (A) Negative    Leuk Esterase, UA Moderate (2+) (A) Negative    Nitrite, UA Positive (A) Negative    Urobilinogen, UA 1.0 E.U./dL 0.2 - 1.0 E.U./dL   Urinalysis, Microscopic Only - Urine, Clean Catch    Collection Time: 03/23/25  2:16 AM    Specimen: Urine, Clean Catch   Result Value Ref Range    RBC, UA Too Numerous to Count (A) None Seen, 0-2 /HPF    WBC, UA Too Numerous to Count (A) None Seen, 0-2 /HPF    Bacteria, UA Trace None Seen, Trace /HPF    Squamous Epithelial Cells, UA 7-12 (A) None Seen, 0-2 /HPF    Methodology Manual Light Microscopy    Comprehensive Metabolic Panel    Collection Time: 03/23/25  2:26 AM    Specimen: Blood   Result Value Ref Range    Glucose 87 65 - 99 mg/dL    BUN 11 6 - 20 mg/dL    Creatinine 0.68 0.57 - 1.00 mg/dL    Sodium 138 136 - 145 mmol/L    Potassium 3.4 (L) 3.5 - 5.2 mmol/L    Chloride 98 98 - 107 mmol/L    CO2 23.0 22.0 - 29.0 mmol/L    Calcium 9.1 8.6 - 10.5 mg/dL    Total Protein 6.9 6.0 - 8.5 g/dL    Albumin 4.2 3.5 - 5.2 g/dL    ALT (SGPT) 21 1 - 33 U/L    AST (SGOT) 20 1 - 32 U/L    Alkaline  Phosphatase 62 39 - 117 U/L    Total Bilirubin 0.5 0.0 - 1.2 mg/dL    Globulin 2.7 gm/dL    A/G Ratio 1.6 g/dL    BUN/Creatinine Ratio 16.2 7.0 - 25.0    Anion Gap 17.0 (H) 5.0 - 15.0 mmol/L    eGFR 111.0 >60.0 mL/min/1.73   CBC Auto Differential    Collection Time: 03/23/25  2:26 AM    Specimen: Blood   Result Value Ref Range    WBC 11.21 (H) 3.40 - 10.80 10*3/mm3    RBC 5.44 (H) 3.77 - 5.28 10*6/mm3    Hemoglobin 15.2 12.0 - 15.9 g/dL    Hematocrit 46.5 34.0 - 46.6 %    MCV 85.5 79.0 - 97.0 fL    MCH 27.9 26.6 - 33.0 pg    MCHC 32.7 31.5 - 35.7 g/dL    RDW 13.8 12.3 - 15.4 %    RDW-SD 42.6 37.0 - 54.0 fl    MPV 11.2 6.0 - 12.0 fL    Platelets 372 140 - 450 10*3/mm3    Neutrophil % 73.6 42.7 - 76.0 %    Lymphocyte % 15.6 (L) 19.6 - 45.3 %    Monocyte % 8.3 5.0 - 12.0 %    Eosinophil % 1.4 0.3 - 6.2 %    Basophil % 0.7 0.0 - 1.5 %    Immature Grans % 0.4 0.0 - 0.5 %    Neutrophils, Absolute 8.25 (H) 1.70 - 7.00 10*3/mm3    Lymphocytes, Absolute 1.75 0.70 - 3.10 10*3/mm3    Monocytes, Absolute 0.93 (H) 0.10 - 0.90 10*3/mm3    Eosinophils, Absolute 0.16 0.00 - 0.40 10*3/mm3    Basophils, Absolute 0.08 0.00 - 0.20 10*3/mm3    Immature Grans, Absolute 0.04 0.00 - 0.05 10*3/mm3    nRBC 0.0 0.0 - 0.2 /100 WBC   Pregnancy, Urine - Urine, Clean Catch    Collection Time: 03/23/25  3:10 AM    Specimen: Urine, Clean Catch   Result Value Ref Range    HCG, Urine QL Negative Negative     Note: In addition to lab results from this visit, the labs listed above may include labs taken at another facility or during a different encounter within the last 24 hours. Please correlate lab times with ED admission and discharge times for further clarification of the services performed during this visit.                 Geoff Gonzalez MD  03/23/25 6112

## 2025-03-24 ENCOUNTER — APPOINTMENT (OUTPATIENT)
Dept: CT IMAGING | Facility: HOSPITAL | Age: 45
End: 2025-03-24
Payer: COMMERCIAL

## 2025-03-24 ENCOUNTER — HOSPITAL ENCOUNTER (EMERGENCY)
Facility: HOSPITAL | Age: 45
Discharge: HOME | End: 2025-03-24
Attending: EMERGENCY MEDICINE | Admitting: EMERGENCY MEDICINE
Payer: COMMERCIAL

## 2025-03-24 ENCOUNTER — TELEPHONE (OUTPATIENT)
Dept: UROLOGY | Facility: CLINIC | Age: 45
End: 2025-03-24
Payer: COMMERCIAL

## 2025-03-24 VITALS
HEART RATE: 88 BPM | DIASTOLIC BLOOD PRESSURE: 100 MMHG | BODY MASS INDEX: 40.27 KG/M2 | TEMPERATURE: 97.9 F | OXYGEN SATURATION: 99 % | HEIGHT: 64 IN | RESPIRATION RATE: 22 BRPM | SYSTOLIC BLOOD PRESSURE: 138 MMHG | WEIGHT: 235.89 LBS

## 2025-03-24 DIAGNOSIS — N13.2 URETERAL STONE WITH HYDRONEPHROSIS: Primary | ICD-10-CM

## 2025-03-24 DIAGNOSIS — N28.89 URETERITIS: ICD-10-CM

## 2025-03-24 DIAGNOSIS — R11.2 NAUSEA AND VOMITING, UNSPECIFIED VOMITING TYPE: ICD-10-CM

## 2025-03-24 DIAGNOSIS — R10.9 ACUTE RIGHT FLANK PAIN: ICD-10-CM

## 2025-03-24 LAB
ALBUMIN SERPL-MCNC: 4.2 G/DL (ref 3.5–5.2)
ALBUMIN/GLOB SERPL: 1.4 G/DL
ALP SERPL-CCNC: 72 U/L (ref 39–117)
ALT SERPL W P-5'-P-CCNC: 27 U/L (ref 1–33)
ANION GAP SERPL CALCULATED.3IONS-SCNC: 15 MMOL/L (ref 5–15)
AST SERPL-CCNC: 29 U/L (ref 1–32)
B-HCG UR QL: NEGATIVE
BACTERIA UR QL AUTO: ABNORMAL /HPF
BASOPHILS # BLD AUTO: 0.08 10*3/MM3 (ref 0–0.2)
BASOPHILS NFR BLD AUTO: 0.5 % (ref 0–1.5)
BILIRUB SERPL-MCNC: 0.6 MG/DL (ref 0–1.2)
BILIRUB UR QL STRIP: NEGATIVE
BUN SERPL-MCNC: 10 MG/DL (ref 6–20)
BUN/CREAT SERPL: 11.5 (ref 7–25)
CALCIUM SPEC-SCNC: 9.3 MG/DL (ref 8.6–10.5)
CHLORIDE SERPL-SCNC: 100 MMOL/L (ref 98–107)
CLARITY UR: CLEAR
CO2 SERPL-SCNC: 23 MMOL/L (ref 22–29)
COLOR UR: YELLOW
CREAT SERPL-MCNC: 0.87 MG/DL (ref 0.57–1)
DEPRECATED RDW RBC AUTO: 42.2 FL (ref 37–54)
EGFRCR SERPLBLD CKD-EPI 2021: 84.9 ML/MIN/1.73
EOSINOPHIL # BLD AUTO: 0.06 10*3/MM3 (ref 0–0.4)
EOSINOPHIL NFR BLD AUTO: 0.4 % (ref 0.3–6.2)
ERYTHROCYTE [DISTWIDTH] IN BLOOD BY AUTOMATED COUNT: 13.5 % (ref 12.3–15.4)
EXPIRATION DATE: NORMAL
GLOBULIN UR ELPH-MCNC: 3 GM/DL
GLUCOSE SERPL-MCNC: 106 MG/DL (ref 65–99)
GLUCOSE UR STRIP-MCNC: NEGATIVE MG/DL
HCG INTACT+B SERPL-ACNC: <0.1 MIU/ML
HCT VFR BLD AUTO: 46.9 % (ref 34–46.6)
HGB BLD-MCNC: 15.5 G/DL (ref 12–15.9)
HGB UR QL STRIP.AUTO: ABNORMAL
HOLD SPECIMEN: NORMAL
HYALINE CASTS UR QL AUTO: ABNORMAL /LPF
IMM GRANULOCYTES # BLD AUTO: 0.08 10*3/MM3 (ref 0–0.05)
IMM GRANULOCYTES NFR BLD AUTO: 0.5 % (ref 0–0.5)
INTERNAL NEGATIVE CONTROL: NEGATIVE
INTERNAL POSITIVE CONTROL: POSITIVE
KETONES UR QL STRIP: ABNORMAL
LEUKOCYTE ESTERASE UR QL STRIP.AUTO: ABNORMAL
LIPASE SERPL-CCNC: 20 U/L (ref 13–60)
LYMPHOCYTES # BLD AUTO: 1.07 10*3/MM3 (ref 0.7–3.1)
LYMPHOCYTES NFR BLD AUTO: 6.7 % (ref 19.6–45.3)
Lab: NORMAL
MCH RBC QN AUTO: 28.1 PG (ref 26.6–33)
MCHC RBC AUTO-ENTMCNC: 33 G/DL (ref 31.5–35.7)
MCV RBC AUTO: 85 FL (ref 79–97)
MONOCYTES # BLD AUTO: 0.9 10*3/MM3 (ref 0.1–0.9)
MONOCYTES NFR BLD AUTO: 5.6 % (ref 5–12)
NEUTROPHILS NFR BLD AUTO: 13.77 10*3/MM3 (ref 1.7–7)
NEUTROPHILS NFR BLD AUTO: 86.3 % (ref 42.7–76)
NITRITE UR QL STRIP: NEGATIVE
NRBC BLD AUTO-RTO: 0 /100 WBC (ref 0–0.2)
PH UR STRIP.AUTO: >=9 [PH] (ref 5–8)
PLATELET # BLD AUTO: 336 10*3/MM3 (ref 140–450)
PMV BLD AUTO: 11.1 FL (ref 6–12)
POTASSIUM SERPL-SCNC: 3.9 MMOL/L (ref 3.5–5.2)
PROT SERPL-MCNC: 7.2 G/DL (ref 6–8.5)
PROT UR QL STRIP: ABNORMAL
RBC # BLD AUTO: 5.52 10*6/MM3 (ref 3.77–5.28)
RBC # UR STRIP: ABNORMAL /HPF
REF LAB TEST METHOD: ABNORMAL
SODIUM SERPL-SCNC: 138 MMOL/L (ref 136–145)
SP GR UR STRIP: 1.02 (ref 1–1.03)
SQUAMOUS #/AREA URNS HPF: ABNORMAL /HPF
UROBILINOGEN UR QL STRIP: ABNORMAL
WBC # UR STRIP: ABNORMAL /HPF
WBC NRBC COR # BLD AUTO: 15.96 10*3/MM3 (ref 3.4–10.8)
WHOLE BLOOD HOLD COAG: NORMAL
WHOLE BLOOD HOLD SPECIMEN: NORMAL

## 2025-03-24 PROCEDURE — 83690 ASSAY OF LIPASE: CPT | Performed by: EMERGENCY MEDICINE

## 2025-03-24 PROCEDURE — 81025 URINE PREGNANCY TEST: CPT | Performed by: EMERGENCY MEDICINE

## 2025-03-24 PROCEDURE — 63710000001 ONDANSETRON ODT 4 MG TABLET DISPERSIBLE: Performed by: EMERGENCY MEDICINE

## 2025-03-24 PROCEDURE — 25010000002 KETOROLAC TROMETHAMINE PER 15 MG: Performed by: EMERGENCY MEDICINE

## 2025-03-24 PROCEDURE — 80053 COMPREHEN METABOLIC PANEL: CPT | Performed by: EMERGENCY MEDICINE

## 2025-03-24 PROCEDURE — 96374 THER/PROPH/DIAG INJ IV PUSH: CPT

## 2025-03-24 PROCEDURE — 84702 CHORIONIC GONADOTROPIN TEST: CPT | Performed by: EMERGENCY MEDICINE

## 2025-03-24 PROCEDURE — 81025 URINE PREGNANCY TEST: CPT

## 2025-03-24 PROCEDURE — 25510000001 IOPAMIDOL 61 % SOLUTION: Performed by: EMERGENCY MEDICINE

## 2025-03-24 PROCEDURE — 99285 EMERGENCY DEPT VISIT HI MDM: CPT

## 2025-03-24 PROCEDURE — 81001 URINALYSIS AUTO W/SCOPE: CPT | Performed by: EMERGENCY MEDICINE

## 2025-03-24 PROCEDURE — 85025 COMPLETE CBC W/AUTO DIFF WBC: CPT

## 2025-03-24 PROCEDURE — 74177 CT ABD & PELVIS W/CONTRAST: CPT

## 2025-03-24 PROCEDURE — 25810000003 SODIUM CHLORIDE 0.9 % SOLUTION: Performed by: EMERGENCY MEDICINE

## 2025-03-24 RX ORDER — IOPAMIDOL 612 MG/ML
85 INJECTION, SOLUTION INTRAVASCULAR
Status: COMPLETED | OUTPATIENT
Start: 2025-03-24 | End: 2025-03-24

## 2025-03-24 RX ORDER — OXYCODONE AND ACETAMINOPHEN 10; 325 MG/1; MG/1
1 TABLET ORAL ONCE
Refills: 0 | Status: COMPLETED | OUTPATIENT
Start: 2025-03-24 | End: 2025-03-24

## 2025-03-24 RX ORDER — ONDANSETRON 4 MG/1
4 TABLET, ORALLY DISINTEGRATING ORAL ONCE
Status: COMPLETED | OUTPATIENT
Start: 2025-03-24 | End: 2025-03-24

## 2025-03-24 RX ORDER — SODIUM CHLORIDE 9 MG/ML
10 INJECTION, SOLUTION INTRAMUSCULAR; INTRAVENOUS; SUBCUTANEOUS AS NEEDED
Status: DISCONTINUED | OUTPATIENT
Start: 2025-03-24 | End: 2025-03-24 | Stop reason: HOSPADM

## 2025-03-24 RX ORDER — KETOROLAC TROMETHAMINE 15 MG/ML
15 INJECTION, SOLUTION INTRAMUSCULAR; INTRAVENOUS ONCE
Status: DISCONTINUED | OUTPATIENT
Start: 2025-03-24 | End: 2025-03-24

## 2025-03-24 RX ORDER — HYDROCODONE BITARTRATE AND ACETAMINOPHEN 10; 325 MG/1; MG/1
1 TABLET ORAL EVERY 6 HOURS PRN
Qty: 6 TABLET | Refills: 0 | Status: SHIPPED | OUTPATIENT
Start: 2025-03-24

## 2025-03-24 RX ORDER — KETOROLAC TROMETHAMINE 10 MG/1
10 TABLET, FILM COATED ORAL EVERY 6 HOURS PRN
Qty: 10 TABLET | Refills: 0 | Status: SHIPPED | OUTPATIENT
Start: 2025-03-24 | End: 2025-03-31

## 2025-03-24 RX ORDER — KETOROLAC TROMETHAMINE 15 MG/ML
15 INJECTION, SOLUTION INTRAMUSCULAR; INTRAVENOUS ONCE
Status: COMPLETED | OUTPATIENT
Start: 2025-03-24 | End: 2025-03-24

## 2025-03-24 RX ADMIN — ONDANSETRON 4 MG: 4 TABLET, ORALLY DISINTEGRATING ORAL at 19:37

## 2025-03-24 RX ADMIN — OXYCODONE HYDROCHLORIDE AND ACETAMINOPHEN 1 TABLET: 10; 325 TABLET ORAL at 19:38

## 2025-03-24 RX ADMIN — SODIUM CHLORIDE 1000 ML: 9 INJECTION, SOLUTION INTRAVENOUS at 19:35

## 2025-03-24 RX ADMIN — KETOROLAC TROMETHAMINE 15 MG: 15 INJECTION, SOLUTION INTRAMUSCULAR; INTRAVENOUS at 19:36

## 2025-03-24 RX ADMIN — IOPAMIDOL 85 ML: 612 INJECTION, SOLUTION INTRAVENOUS at 17:37

## 2025-03-24 NOTE — ED PROVIDER NOTES
[place]    EMERGENCY DEPARTMENT ENCOUNTER      Pt Name: Alla Selby  MRN: [id number]  YOB: 1980  Date of evaluation: 3/24/2025  Provider: Edgar Kirkland MD    CHIEF COMPLAINT       Chief Complaint   Patient presents with    Post-op Problem         HISTORY OF PRESENT ILLNESS   Alla Selby is a 45 y.o. female who presents to the emergency department with complaint of ongoing pain in the right flank along with nausea.  Patient recently treated for ureteral stone and has ureteral stent that was recently removed.  She was seen here in our emergency department last night with similar complaints.  She had an abdominal CT which was unremarkable.  She was felt to have a urinary tract infection, given a dose of IV antibiotics, and started on oral antibiotics.  She is continue to have discomfort today and so came back to the emergency department.  She denies any vomiting, diarrhea, fever, or chills.      Nursing notes were reviewed.    REVIEW OF SYSTEMS     ROS:  A chief complaint appropriate review of systems was completed and is negative except as noted in the HPI.      PAST MEDICAL HISTORY     Past Medical History:   Diagnosis Date    Anxiety 2019    Fibroid 2019    Breasts    H/O chlamydia infection 2001    History of anxiety 2017    Hyperlipidemia 2024    Hypertension 2024    Early 2024 - Lisinopril    Kidney stone 3/5/2025    Liver disease 3/5/25    fatty liver    Trauma     Vaginal infection     Varicella          SURGICAL HISTORY       Past Surgical History:   Procedure Laterality Date    GUM SURGERY  2001    KIDNEY STONE SURGERY  3/17/25    LITHOTRIPSY  3/17/24    URETEROSCOPY LASER LITHOTRIPSY WITH STENT INSERTION Right 03/17/2025    Procedure: URETEROSCOPY LASER LITHOTRIPSY WITH STENT INSERTION RIGHT;  Surgeon: Fab Zamorano MD;  Location: [facility];  Service: Urology;  Laterality: Right;    WISDOM TOOTH EXTRACTION  2000         CURRENT MEDICATIONS     No current  facility-administered medications for this encounter.    Current Outpatient Medications:     albuterol sulfate  (90 Base) MCG/ACT inhaler, 1 INHALATION INHALED EVERY 4 HOURS, Disp: , Rfl:     atorvastatin (LIPITOR) 20 MG tablet, take 1 tablet by mouth every day at bedtime for 90 days, Disp: , Rfl:     azithromycin (Zithromax Z-Brandon) 250 MG tablet, Take 2 tablets by mouth on day 1, then 1 tablet daily on days 2-5, Disp: 6 tablet, Rfl: 0    cefuroxime (CEFTIN) 500 MG tablet, Take 1 tablet by mouth 2 (Two) Times a Day for 10 days., Disp: 20 tablet, Rfl: 0    diazePAM (VALIUM) 5 MG tablet, Take 1 tablet by mouth Daily., Disp: , Rfl:     Etonogestrel (NEXPLANON SC), Nexplanon, Disp: , Rfl:     HYDROcodone-acetaminophen (NORCO)  MG per tablet, Take 1 tablet by mouth Every 6 (Six) Hours As Needed for Moderate Pain., Disp: 6 tablet, Rfl: 0    HYDROcodone-acetaminophen (NORCO) 5-325 MG per tablet, , Disp: , Rfl:     ketorolac (TORADOL) 10 MG tablet, Take 1 tablet by mouth Every 6 (Six) Hours As Needed for Moderate Pain., Disp: 15 tablet, Rfl: 0    ketorolac (TORADOL) 10 MG tablet, Take 1 tablet by mouth Every 6 (Six) Hours As Needed for Moderate Pain., Disp: 10 tablet, Rfl: 0    lamoTRIgine (LaMICtal) 25 MG tablet, Take 3 tablets by mouth every night at bedtime., Disp: , Rfl:     lansoprazole (PREVACID) 30 MG capsule, Take 1 capsule by mouth Daily., Disp: 30 capsule, Rfl: 5    lisinopril-hydrochlorothiazide (PRINZIDE,ZESTORETIC) 10-12.5 MG per tablet, , Disp: , Rfl:     nitrofurantoin, macrocrystal-monohydrate, (Macrobid) 100 MG capsule, Take 1 capsule by mouth 2 (Two) Times a Day., Disp: 14 capsule, Rfl: 0    oxybutynin XL (DITROPAN-XL) 5 MG 24 hr tablet, Take 1 tablet by mouth Daily. PRN BLADDER SPASM, Disp: 14 tablet, Rfl: 0    phenazopyridine (Pyridium) 200 MG tablet, Take 1 tablet by mouth 3 (Three) Times a Day As Needed for Bladder Spasms., Disp: 20 tablet, Rfl: 0    promethazine-dextromethorphan  "(PROMETHAZINE-DM) 6.25-15 MG/5ML syrup, Take 5 mL by mouth At Night As Needed for Cough., Disp: 100 mL, Rfl: 0    tamsulosin (FLOMAX) 0.4 MG capsule 24 hr capsule, , Disp: , Rfl:     tamsulosin (FLOMAX) 0.4 MG capsule 24 hr capsule, Take 1 capsule by mouth Daily for 14 days., Disp: 14 capsule, Rfl: 0    ALLERGIES     Patient has no known allergies.    FAMILY HISTORY       Family History   Problem Relation Age of Onset    Breast cancer Maternal Grandmother         70    Cancer Maternal Grandmother         Terminal Breast Cancer    Hypertension Maternal Grandmother     Ovarian cancer Mother     Cancer Maternal Grandfather         Terminal Esophageal Cancer    Colon cancer Neg Hx           SOCIAL HISTORY       Social History     Socioeconomic History    Marital status: Single   Tobacco Use    Smoking status: Former     Current packs/day: 0.50     Average packs/day: 0.5 packs/day for 26.2 years (13.1 ttl pk-yrs)     Types: Cigarettes     Start date:      Passive exposure: Current    Smokeless tobacco: Never    Tobacco comments:     Smoked menthol 1 pack a day from [id number]   Vaping Use    Vaping status: Never Used   Substance and Sexual Activity    Alcohol use: Yes     Alcohol/week: 10.0 standard drinks of alcohol     Types: 4 Glasses of wine, 6 Cans of beer per week     Comment: 6 pack of beer on weekends, or a couple bottles of red wine    Drug use: No    Sexual activity: Yes     Partners: Male     Birth control/protection: Nexplanon     Comment: Nexplanon  25. Appt sched 3/31 to discuss tubal         PHYSICAL EXAM    (up to 7 for level 4, 8 or more for level 5)     Vitals:    25 1532 25 1533   BP:  138/100   BP Location:  Left arm   Patient Position:  Sitting   Pulse: 88    Resp: 22    Temp: 97.9 °F (36.6 °C)    TempSrc: Oral    SpO2: 99%    Weight: 107 kg (235 lb 14.3 oz)    Height: 162.6 cm (64\")        General: Awake, alert, no acute distress.  HEENT: Conjunctivae normal.  Neck: " Trachea midline.  Cardiac: Heart regular rate, rhythm, no murmurs, rubs, or gallops  Lungs: Lungs are clear to auscultation, there is no wheezing, rhonchi, or rales. There is no use of accessory muscles.  Chest wall: There is no tenderness to palpation over the chest wall or over ribs  Abdomen: Abdomen is soft, nontender, nondistended. There are no firm or pulsatile masses, no rebound rigidity or guarding.   Musculoskeletal: No deformity.  Neuro: Alert and oriented x 4.  Dermatology: Skin is warm and dry  Psych: Mentation is grossly normal, cognition is grossly normal. Affect is appropriate.        DIAGNOSTIC RESULTS     EKG: All EKGs are interpreted by the Emergency Department Physician who either signs or Co-signs this chart in the absence of a cardiologist.    No orders to display         RADIOLOGY:   [x] Radiologist's Report Reviewed:  CT Abdomen Pelvis With Contrast   Final Result   Impression:   Mild to moderate right-sided hydroureteronephrosis secondary to 1 or 2 adjacent calculi in the distal right ureter at the right UVJ, in total measuring 8 x 3 mm. The ureteral calculi are new from prior CT while the right-sided hydronephrosis is similar    to prior CT. There is delayed excretion of the right kidney on the delayed phase images. There is some right urothelial enhancement. Correlate with urinalysis.         Duplex left kidney with duplicated ureters; no evidence of left-sided hydronephrosis or nephrolithiasis.         Small amount of air within the urinary bladder lumen likely reflecting recent instrumentation. Correlate with urinalysis.         Incidental note of a 3.0 cm cyst of the left ovary/adnexa.            Electronically Signed: Bry Lloyd MD     3/24/2025 5:58 PM EDT     Workstation ID: [id number]          I ordered and independently reviewed the above noted radiographic studies.        LABS:    I have reviewed and interpreted all of the currently available lab results from this visit (if  applicable):  Results for orders placed or performed during the hospital encounter of 03/24/25   Comprehensive Metabolic Panel    Collection Time: 03/24/25  4:35 PM    Specimen: Blood   Result Value Ref Range    Glucose 106 (H) 65 - 99 mg/dL    BUN 10 6 - 20 mg/dL    Creatinine 0.87 0.57 - 1.00 mg/dL    Sodium 138 136 - 145 mmol/L    Potassium 3.9 3.5 - 5.2 mmol/L    Chloride 100 98 - 107 mmol/L    CO2 23.0 22.0 - 29.0 mmol/L    Calcium 9.3 8.6 - 10.5 mg/dL    Total Protein 7.2 6.0 - 8.5 g/dL    Albumin 4.2 3.5 - 5.2 g/dL    ALT (SGPT) 27 1 - 33 U/L    AST (SGOT) 29 1 - 32 U/L    Alkaline Phosphatase 72 39 - 117 U/L    Total Bilirubin 0.6 0.0 - 1.2 mg/dL    Globulin 3.0 gm/dL    A/G Ratio 1.4 g/dL    BUN/Creatinine Ratio 11.5 7.0 - 25.0    Anion Gap 15.0 5.0 - 15.0 mmol/L    eGFR 84.9 >60.0 mL/min/1.73   Lipase    Collection Time: 03/24/25  4:35 PM    Specimen: Blood   Result Value Ref Range    Lipase 20 13 - 60 U/L   hCG, Quantitative, Pregnancy    Collection Time: 03/24/25  4:35 PM    Specimen: Blood   Result Value Ref Range    HCG Quantitative <0.10 mIU/mL   CBC Auto Differential    Collection Time: 03/24/25  4:35 PM    Specimen: Blood   Result Value Ref Range    WBC 15.96 (H) 3.40 - 10.80 10*3/mm3    RBC 5.52 (H) 3.77 - 5.28 10*6/mm3    Hemoglobin 15.5 12.0 - 15.9 g/dL    Hematocrit 46.9 (H) 34.0 - 46.6 %    MCV 85.0 79.0 - 97.0 fL    MCH 28.1 26.6 - 33.0 pg    MCHC 33.0 31.5 - 35.7 g/dL    RDW 13.5 12.3 - 15.4 %    RDW-SD 42.2 37.0 - 54.0 fl    MPV 11.1 6.0 - 12.0 fL    Platelets 336 140 - 450 10*3/mm3    Neutrophil % 86.3 (H) 42.7 - 76.0 %    Lymphocyte % 6.7 (L) 19.6 - 45.3 %    Monocyte % 5.6 5.0 - 12.0 %    Eosinophil % 0.4 0.3 - 6.2 %    Basophil % 0.5 0.0 - 1.5 %    Immature Grans % 0.5 0.0 - 0.5 %    Neutrophils, Absolute 13.77 (H) 1.70 - 7.00 10*3/mm3    Lymphocytes, Absolute 1.07 0.70 - 3.10 10*3/mm3    Monocytes, Absolute 0.90 0.10 - 0.90 10*3/mm3    Eosinophils, Absolute 0.06 0.00 - 0.40 10*3/mm3     Basophils, Absolute 0.08 0.00 - 0.20 10*3/mm3    Immature Grans, Absolute 0.08 (H) 0.00 - 0.05 10*3/mm3    nRBC 0.0 0.0 - 0.2 /100 WBC   Green Top (Gel)    Collection Time: 03/24/25  4:35 PM   Result Value Ref Range    Extra Tube Hold for add-ons.    Lavender Top    Collection Time: 03/24/25  4:35 PM   Result Value Ref Range    Extra Tube hold for add-on    Gold Top - SST    Collection Time: 03/24/25  4:35 PM   Result Value Ref Range    Extra Tube Hold for add-ons.    Gray Top    Collection Time: 03/24/25  4:35 PM   Result Value Ref Range    Extra Tube Hold for add-ons.    Light Blue Top    Collection Time: 03/24/25  4:35 PM   Result Value Ref Range    Extra Tube Hold for add-ons.    Urinalysis With Microscopic If Indicated (No Culture) - Urine, Clean Catch    Collection Time: 03/24/25  4:39 PM    Specimen: Urine, Clean Catch   Result Value Ref Range    Color, UA Yellow Yellow, Straw    Appearance, UA Clear Clear    pH, UA >=9.0 (H) 5.0 - 8.0    Specific Gravity, UA 1.019 1.001 - 1.030    Glucose, UA Negative Negative    Ketones, UA 40 mg/dL (2+) (A) Negative    Bilirubin, UA Negative Negative    Blood, UA Trace (A) Negative    Protein, UA Trace (A) Negative    Leuk Esterase, UA Trace (A) Negative    Nitrite, UA Negative Negative    Urobilinogen, UA 0.2 E.U./dL 0.2 - 1.0 E.U./dL   Urinalysis, Microscopic Only - Urine, Clean Catch    Collection Time: 03/24/25  4:39 PM    Specimen: Urine, Clean Catch   Result Value Ref Range    RBC, UA 21-50 (A) None Seen, 0-2 /HPF    WBC, UA 6-10 (A) None Seen, 0-2 /HPF    Bacteria, UA None Seen None Seen, Trace /HPF    Squamous Epithelial Cells, UA 3-6 (A) None Seen, 0-2 /HPF    Hyaline Casts, UA 0-6 0 - 6 /LPF    Methodology Automated Microscopy    POC Urine Pregnancy    Collection Time: 03/24/25  4:46 PM    Specimen: Urine   Result Value Ref Range    HCG, Urine, QL Negative     Lot Number [id number]     Internal Positive Control Positive     Internal Negative Control Negative      Expiration Date 05/27/2026         If labs were ordered, I independently reviewed the results and considered them in treating the patient.      EMERGENCY DEPARTMENT COURSE and DIFFERENTIAL DIAGNOSIS/MDM:   Vitals:  AS OF 00:51 EDT    BP - 138/100  HR - 88  TEMP - 97.9 °F (36.6 °C) (Oral)  O2 SATS - 99%        Discussion below represents my analysis of pertinent findings related to patient's condition, differential diagnosis, treatment plan and final disposition.      Differential diagnosis:  The differential diagnosis associated with the patient's presentation includes: Ureteral stone, cystitis, pyelonephritis, perinephric abscess, appendicitis, diverticulitis      Independent interpretations (ECG/rhythm strip/X-ray/US/CT scan): I independently interpreted the patient's abdominal CT and cardiac monitor.  There is no obstructive change within the abdomen and the patient is in sinus rhythm.      Additional sources:  Discussed/obtained information from independent historians:   [] Spouse:   [] Parent:   [] Friend:   [] EMS:   [] Other:  External (non-ED) record review:   [] Inpatient record:   [] Office record:   [] Outpatient record:   [] Prior Outpatient labs:   [] Prior Outpatient radiology:   [] Primary Care record:   [] Outside ED record:   [x] Other: I reviewed ED record from last night including abdominal CT.  Patient received dose of Rocephin and abdominal CT showed no acute intra-abdominal pathology.      Patient's care impacted by:   [] Diabetes   [x] Hypertension   [] Coronary Artery Disease   [] Cancer   [x] Other: Hyperlipidemia    Care significantly affected by Social Determinants of Health (housing and economic circumstances, unemployment)    [] Yes     [x] No   If yes, Patient's care significantly limited by  Social Determinants of Health including:    [] Inadequate housing    [] Low income    [] Alcoholism and drug addiction in family    [] Problems related to primary support group    []  Unemployment    [] Problems related to employment    [] Other Social Determinants of Health:       Consideration of admission/observation vs discharge: Considered admission, however patient had significant symptomatic improvement, is not septic, consulted urology and ultimately felt the patient was stable for discharge home with outpatient follow-up.      I considered prescription management with:    [x] Pain medication: Patient given Percocet in the ED, prescribed hydrocodone and Toradol for home   [] Antiviral:   [] Antibiotic:   [] Other:        ED Course:    ED Course as of 03/25/25 0051   Mon Mar 24, 2025   1817 I spoke with Dr. Zamorano with urology.  I discussed history, presentation, workup, CT.  He feels that findings on today's CT are likely passing stone fragments from recent procedure.  He says that he is willing to consult if patient needs to be admitted overnight for pain control. [NS]   2134 On reexamination, the patient feels much better.  She is resting comfortably in bed and has no ongoing pain.  She is concerned that pain will come back and is requesting some pain medication to go home with.  Her CT today does differ from the CT performed during her emergency department visit last night and that it shows what appears to be stones at the UVJ.  Her degree of hydronephrosis is unchanged.  As noted above, I discussed this with her urologist, Dr. Zamorano, who feels that these are fragments from the stone that was just treated and does not require further intervention at this point.  I also discussed with him that patient was diagnosed with some ureteritis on CT imaging last night and started on antibiotics and the patient's white blood cell count has increased today compared with yesterday.  I suspect that her leukocytosis is more reactive as she is otherwise well-appearing, nontoxic, afebrile, not tachycardic, and urine sample appears significantly improved compared with sample yesterday.  She is not  systemically ill or septic at this point.  Dr. Zamorano felt that if patient's pain was improved that she can be discharged home and follow-up as an outpatient.  I discussed this with the patient and she is okay with that plan.  I did discuss that if she develops any fever, chills, or worsening symptoms that she should return to the emergency department immediately.  She understands and agrees to do so.  I will prescribe some narcotic pain medication along with her previously prescribed oral Toradol.  She has nausea medicine at home and encouraged adequate fluid intake. [NS]      ED Course User Index  [NS] Edgar Kirkland MD             I had a discussion with the patient/family regarding diagnosis, diagnostic results, treatment plan, and medications.  The patient/family indicated understanding of these instructions.  I spent adequate time at the bedside preceding discharge necessary to personally discuss the aftercare instructions, giving patient education, providing explanations of the results of our evaluations/findings, and my decision making to assure that the patient/family understand the plan of care.  Time was allotted to answer questions at that time and throughout the ED course.  Emphasis was placed on timely follow-up after discharge.  I also discussed the potential for the development of an acute emergent condition requiring further evaluation, admission, or even surgical intervention. I discussed that we found nothing during the visit today indicating the need for further workup, admission, or the presence of an unstable medical condition.  I encouraged the patient to return to the emergency department immediately for ANY concerns, worsening, new complaints, or if symptoms persist and unable to seek follow-up in a timely fashion.  The patient/family expressed understanding and agreement with this plan.  The patient will follow-up with their PCP in 1-2 days for reevaluation.            PROCEDURES:  Procedures    CRITICAL CARE TIME        FINAL IMPRESSION      1. Ureteral stone with hydronephrosis    2. Ureteritis    3. Nausea and vomiting, unspecified vomiting type    4. Acute right flank pain          DISPOSITION/PLAN     ED Disposition       ED Disposition   Discharge    Condition   Stable    Comment   --                 Comment: Please note this report has been produced using speech recognition software.      Edgar Kirkland MD  Attending Emergency Physician             Edgar Kirkland MD  03/25/25 0054

## 2025-03-24 NOTE — TELEPHONE ENCOUNTER
"I called PT after being told she had contacted our [place] staff . PT sounded very ill over the phone. She states she in excruciating pain over the phone. She states she has taken more than she was supposed of her pain meds and it is not helping. She states that her kidney feels like its going to \"pop\". I advised her to present to the ER as soon as she can. She was sent home yesterday from the ER with antibiotics after her pain was controlled in the ER. She feels she has gotten much worse. She is heading to the ER.  "

## 2025-03-25 NOTE — DISCHARGE INSTRUCTIONS
Please return to the emergency department immediately if you develop any worsening pain, uncontrolled vomiting, fever, chills.  Please take your antibiotics as prescribed and follow-up with your urologist as soon as you can for reevaluation.

## 2025-03-31 ENCOUNTER — OFFICE VISIT (OUTPATIENT)
Dept: OBSTETRICS AND GYNECOLOGY | Facility: CLINIC | Age: 45
End: 2025-03-31
Payer: COMMERCIAL

## 2025-03-31 VITALS
SYSTOLIC BLOOD PRESSURE: 122 MMHG | WEIGHT: 239 LBS | DIASTOLIC BLOOD PRESSURE: 80 MMHG | BODY MASS INDEX: 40.8 KG/M2 | HEIGHT: 64 IN

## 2025-03-31 DIAGNOSIS — Z11.3 ROUTINE SCREENING FOR STI (SEXUALLY TRANSMITTED INFECTION): ICD-10-CM

## 2025-03-31 DIAGNOSIS — N93.9 ABNORMAL UTERINE BLEEDING (AUB): ICD-10-CM

## 2025-03-31 DIAGNOSIS — N89.8 VAGINAL DISCHARGE: ICD-10-CM

## 2025-03-31 DIAGNOSIS — Z30.09 STERILIZATION EDUCATION: ICD-10-CM

## 2025-03-31 DIAGNOSIS — Z01.419 WELL WOMAN EXAM WITH ROUTINE GYNECOLOGICAL EXAM: Primary | ICD-10-CM

## 2025-03-31 DIAGNOSIS — Z80.41 FAMILY HISTORY OF OVARIAN CANCER: ICD-10-CM

## 2025-03-31 DIAGNOSIS — N83.202 CYST OF LEFT OVARY: ICD-10-CM

## 2025-03-31 PROBLEM — Z30.017 NEXPLANON INSERTION: Status: RESOLVED | Noted: 2018-12-07 | Resolved: 2025-03-31

## 2025-03-31 PROCEDURE — 99386 PREV VISIT NEW AGE 40-64: CPT | Performed by: OBSTETRICS & GYNECOLOGY

## 2025-03-31 NOTE — PROGRESS NOTES
Subjective   Chief Complaint   Patient presents with    Gynecologic Exam     New GYN, re-establish care.  Annual exam with pap.  Discuss tubal sterilization.  Patient has a Nexplanon in place that  2025.       Alla Selby is a 45 y.o. year old  presenting to be seen for her annual exam, re-establish care and discuss permanent sterilization.     SEXUAL Hx:  She is currently sexually active. Since we last saw each other she has had ONE new sexual partner   Condoms are never used.  She would like to be screened for STD's at today's exam.  Current birth control method: Nexplanon.  She is not happy with her current method of contraception and does want to discuss alternative methods of contraception. She has questions about permanent sterilization. She knows her nexplanon is considered  and does not desire to have children.   MENSTRUAL Hx:  Patient's last menstrual period was 2025 (approximate).  She reports her menstrual cycles have always been heavy but in the past ~ 6-8 months her cycles have been heavier.   In the past 6 months her cycles have been regular, predictable and occur monthly.  Her menstrual flow is typically moderately heavy.   Each month on average there are roughly 0 day(s) of very heavy flow.    Duration ~ 7 days.   Intermenstrual bleeding is absent.    Post-coital bleeding is absent.  Dysmenorrhea: none  PMS: none  Her cycles ARE a source of concern for her that she wishes to discuss today.  HEALTH Hx:  She exercises regularly: no (but is planning to start exercising more).  She wears her seat belt: yes.  She has concerns about domestic violence: no.  OTHER THINGS SHE WANTS TO DISCUSS TODAY:  Nothing else    The following portions of the patient's history were reviewed and updated as appropriate:problem list, current medications, allergies, past family history, past medical history, past social history, and past surgical history.    Social History    Tobacco  "Use      Smoking status: Former        Packs/day: 0.50        Years: 0.5 packs/day for 26.2 years (13.1 ttl pk-yrs)        Types: Cigarettes        Start date: 1999        Passive exposure: Current      Smokeless tobacco: Never      Tobacco comments: Smoked menthol 1 pack a day from 1810-6431    Review of Systems  Constitutional POS: nothing reported    NEG: anorexia or night sweats   Genitourinary POS: nothing reported    NEG: dysuria or hematuria      Gastointestinal POS: nothing reported    NEG: bloating, change in bowel habits, melena, or reflux symptoms   Integument POS: nothing reported    NEG: moles that are changing in size, shape, color or rashes   Breast POS: nothing reported    NEG: persistent breast lump, skin dimpling, or nipple discharge        Objective   /80   Ht 162.6 cm (64\")   Wt 108 kg (239 lb)   LMP 03/12/2025 (Approximate)   Breastfeeding No   BMI 41.02 kg/m²     General:  well developed; well nourished  no acute distress  mentation appropriate   Skin:  No suspicious lesions seen   Thyroid: normal to inspection and palpation   Breasts:  Examined in supine position  Symmetric without masses or skin dimpling  Nipples normal without inversion, lesions or discharge  There are no palpable axillary nodes   Abdomen: soft, non-tender; no masses  no umbilical or inguinal hernias are present  no hepato-splenomegaly   Pelvis: Clinical staff was present for exam  External genitalia:  normal appearance of the external genitalia including [place]'s and [place]'s glands.  :  urethral meatus normal; urethra normal:  Vaginal:  normal pink mucosa without prolapse or lesions.  Cervix:  normal appearance.  Uterus:  normal size, shape and consistency.  Adnexa:  normal bimanual exam of the adnexa.  Rectal:  digital rectal exam not performed; anus visually normal appearing.        Assessment   Normal GYN exam  AUB  Sterilization consultation  Cyst of left ovary   Family history of ovarian cancer    "   Plan   Pap was done today.  If she does not receive the results of the Pap within 2 weeks  time, she was instructed to call to find out the results.  I explained to Alla that the recommendations for Pap smear interval in a low risk patient's has lengthened to 3 years time.  I encouraged her to be seen yearly for a full physical exam including breast and pelvic exam even during the off years when PAP's will not be performed.  The following tests were ordered today: STD swabs for GC, chlamydia, trichimoniasis, and yeast/BV .  It was explained to Alla that all lab test should be back within the one week after they are performed. She will be notified about the results, regardless of the findings. If she has not been contacted by the office within 2 weeks after the test has been performed, it is her responsibility to contact us to learn about her results.  She was encouraged to get yearly mammograms.  She should report any palpable breast lump(s) or skin changes regardless of mammographic findings.  I explained to Alla that notification regarding her mammogram results will come from the center performing the study.  Our office will not be routinely calling with mammogram results.  It is her responsibility to make sure that the results from the mammogram are communicated to her by the breast center.  If she has any questions about the results, she is welcome to call our office anytime.  Recommend ambulatory referral to genetic counseling - referral placed.   Reviewed the importance of exercise 2-3 times per week with at least 20-30 minutes of cardio  I strongly encouraged Alla to wear her seat belt, emphasizing that seat belt use, although not perfect has been shown to save lives.  We reviewed LARC options in regards to contraception and also permanent sterilization with laparoscopic bilateral salpingectomy and nexplanon removal and she desires to proceed with this and understands the permanence of it. Will  place order and message  to work on scheduling. In the interim, will schedule pelvic ultrasound given ovarian cyst seen on recent imaging/CT and history of AUB which may be due to predominance of progesterone in nexplanon implant. Reviewed to not rely on this for contraception at this time and she verbalized understanding.   No prescription was given or electronically sent at today's visit  The importance of keeping all planned follow-up and taking all medications as prescribed was emphasized.  Follow up for annual exam in ~ one year    No orders of the defined types were placed in this encounter.       Orders Placed This Encounter   Procedures    External Facility Surgical/Procedural Request     Standing Status:   Future     Expected Date:   3/31/2025     Expiration Date:   3/31/2026     Requested Location:   [facility]     Procedure/ Order for Consent:   Laparoscopic bilateral salpingectomy, nexplanon removal     Laterality:   Bilateral     Anesthesia type:   General [80]     Pre-op diagnosis:   desires permanent sterilization, desires removal of nexplanon    OneSwab - Swab, Cervix, Endocervix     6288, 759, 560     Standing Status:   Future     Number of Occurrences:   1     Expected Date:   3/31/2025     Expiration Date:   6/30/2026     Release to patient:   Routine Release [[id number]]    Mammo Screening Digital Tomosynthesis Bilateral With CAD     Standing Status:   Future     Expected Date:   9/27/2025     Expiration Date:   3/31/2026     Reason for Exam::   due for breast cancer screening     Patient Pregnant:   No     Release to patient:   Routine Release [[id number]]    US Non-ob Transvaginal     Standing Status:   Future     Expected Date:   4/30/2025     Expiration Date:   3/31/2026     Reason for Exam::   AUB, history of left ovarian cyst     Release to patient:   Routine Release [[id number]]    Ambulatory Referral to Genetic Counseling/Testing     Referral Priority:   Routine     Referral Type:    Consultation     Referral Reason:   Specialty Services Required     Number of Visits Requested:   1         This note was electronically signed.    Venice Moralez MD  March 31, 2025

## 2025-04-01 LAB — REF LAB TEST METHOD: NORMAL

## 2025-04-03 DIAGNOSIS — N30.00 ACUTE CYSTITIS WITHOUT HEMATURIA: Primary | ICD-10-CM

## 2025-04-04 ENCOUNTER — CLINICAL SUPPORT (OUTPATIENT)
Dept: UROLOGY | Facility: CLINIC | Age: 45
End: 2025-04-04
Payer: COMMERCIAL

## 2025-04-04 DIAGNOSIS — N30.00 ACUTE CYSTITIS WITHOUT HEMATURIA: Primary | ICD-10-CM

## 2025-04-04 PROCEDURE — 87086 URINE CULTURE/COLONY COUNT: CPT | Performed by: UROLOGY

## 2025-04-04 PROCEDURE — 99211 OFF/OP EST MAY X REQ PHY/QHP: CPT | Performed by: UROLOGY

## 2025-04-06 LAB — BACTERIA SPEC AEROBE CULT: ABNORMAL

## 2025-04-08 DIAGNOSIS — N30.00 ACUTE CYSTITIS WITHOUT HEMATURIA: Primary | ICD-10-CM

## 2025-04-08 RX ORDER — SULFAMETHOXAZOLE AND TRIMETHOPRIM 800; 160 MG/1; MG/1
1 TABLET ORAL 2 TIMES DAILY
Qty: 20 TABLET | Refills: 0 | Status: SHIPPED | OUTPATIENT
Start: 2025-04-08

## 2025-04-16 ENCOUNTER — TELEPHONE (OUTPATIENT)
Dept: OBSTETRICS AND GYNECOLOGY | Facility: CLINIC | Age: 45
End: 2025-04-16
Payer: COMMERCIAL

## 2025-04-16 DIAGNOSIS — N83.202 CYST OF LEFT OVARY: Primary | ICD-10-CM

## 2025-04-16 NOTE — TELEPHONE ENCOUNTER
Please apologize for the delay in communicating her pelvic ultrasound results.     She can be informed her recent pelvic ultrasound shows a normal uterus in regards to her history of bleeding. The right ovary appears normal. There is a simple appearing left ovarian cyst measuring about 3 cm and it appears benign or non cancerous. I recommend we follow this up in ~ 6 weeks prior to her scheduled surgery but I don't think this is overall anything to be concerned about. Please let me know if she has any questions or concerns.     Orders Placed This Encounter   Procedures    US Non-ob Transvaginal     Standing Status:   Future     Expected Date:   6/15/2025     Expiration Date:   4/16/2026     Reason for Exam::   Recheck ovarian cyst     Release to patient:   Routine Release [[id number]]       Thanks, Venice Moralez MD

## 2025-04-16 NOTE — TELEPHONE ENCOUNTER
Pt informed and stated understanding.  Pt has been scheduled for a repeat ultrasound on 5/29/25 at 4 PM.

## 2025-04-27 ENCOUNTER — HOSPITAL ENCOUNTER (OUTPATIENT)
Facility: HOSPITAL | Age: 45
Discharge: HOME | End: 2025-04-27
Admitting: UROLOGY
Payer: COMMERCIAL

## 2025-04-27 DIAGNOSIS — N13.2 URETERAL STONE WITH HYDRONEPHROSIS: ICD-10-CM

## 2025-04-27 PROCEDURE — 76775 US EXAM ABDO BACK WALL LIM: CPT

## 2025-04-29 ENCOUNTER — OFFICE VISIT (OUTPATIENT)
Dept: UROLOGY | Facility: CLINIC | Age: 45
End: 2025-04-29
Payer: COMMERCIAL

## 2025-04-29 VITALS — HEIGHT: 64 IN | BODY MASS INDEX: 40.97 KG/M2 | WEIGHT: 240 LBS

## 2025-04-29 DIAGNOSIS — N20.0 NEPHROLITHIASIS: Primary | ICD-10-CM

## 2025-04-29 PROCEDURE — 99214 OFFICE O/P EST MOD 30 MIN: CPT | Performed by: UROLOGY

## 2025-04-29 NOTE — PROGRESS NOTES
Office Note Kidney Stone      Patient Name: Alla Selby  : 1980   MRN: [id number]     Chief Complaint: History of Nephrolithiasis/Ureterolithiasis       History of Present Illness: Alla Selby is a 45 y.o. female who presents today for follow-up after stone treatment.  Ultrasound has been completed.  Today she reports that she is doing well overall.        Subjective      Review of System: Review of Systems   Genitourinary:  Negative for decreased urine volume, difficulty urinating, dysuria, enuresis, flank pain, frequency, hematuria and urgency.        I have reviewed the ROS documented by my clinical staff, updated as appropriate and I agree. Fab Zamorano MD    Past Medical History:   Past Medical History:   Diagnosis Date    Anxiety 2019    Fibroid 2019    Breasts    H/O chlamydia infection     History of anxiety     Hyperlipidemia     Hypertension     Early  - Lisinopril    Kidney stone 3/5/2025    Liver disease 3/5/25    fatty liver    Trauma     Vaginal infection     Varicella        Past Surgical History:   Past Surgical History:   Procedure Laterality Date    GUM SURGERY      KIDNEY STONE SURGERY  3/17/25    LITHOTRIPSY  3/17/24    URETEROSCOPY LASER LITHOTRIPSY WITH STENT INSERTION Right 2025    Procedure: URETEROSCOPY LASER LITHOTRIPSY WITH STENT INSERTION RIGHT;  Surgeon: Fab Zamorano MD;  [facility]  Service: Urology;  Laterality: Right;    WISDOM TOOTH EXTRACTION         Family History:   Family History   Problem Relation Age of Onset    Breast cancer Maternal Grandmother         70    Cancer Maternal Grandmother         Terminal Breast Cancer    Hypertension Maternal Grandmother     Cancer Maternal Grandfather         Terminal Esophageal Cancer    Colon cancer Neg Hx        Social History:   Social History     Socioeconomic History    Marital status: Single   Tobacco Use    Smoking status: Former     Current packs/day: 0.50  "    Average packs/day: 0.5 packs/day for 26.3 years (13.2 ttl pk-yrs)     Types: Cigarettes     Start date:      Passive exposure: Current    Smokeless tobacco: Never    Tobacco comments:     Smoked menthol 1 pack a day from 0505-0549   Vaping Use    Vaping status: Never Used   Substance and Sexual Activity    Alcohol use: Yes     Alcohol/week: 10.0 standard drinks of alcohol     Types: 4 Glasses of wine, 6 Cans of beer per week     Comment: 6 pack of beer on weekends, or a couple bottles of red wine    Drug use: No    Sexual activity: Yes     Partners: Male     Birth control/protection: Nexplanon     Comment: Nexplanon  25. Appt sched 3/31 to discuss tubal       Medications:     Current Outpatient Medications:     diazePAM (VALIUM) 5 MG tablet, Take 1 tablet by mouth Daily., Disp: , Rfl:     Etonogestrel (NEXPLANON SC), Nexplanon, Disp: , Rfl:     HYDROcodone-acetaminophen (NORCO)  MG per tablet, Take 1 tablet by mouth Every 6 (Six) Hours As Needed for Moderate Pain., Disp: 6 tablet, Rfl: 0    lamoTRIgine (LaMICtal) 25 MG tablet, Take 3 tablets by mouth every night at bedtime., Disp: , Rfl:     lansoprazole (PREVACID) 30 MG capsule, Take 1 capsule by mouth Daily., Disp: 30 capsule, Rfl: 5    lisinopril-hydrochlorothiazide (PRINZIDE,ZESTORETIC) 10-12.5 MG per tablet, , Disp: , Rfl:     SEMAGLUTIDE-WEIGHT MANAGEMENT SC, Inject 1 vial under the skin into the appropriate area as directed 1 (One) Time Per Week., Disp: , Rfl:     sulfamethoxazole-trimethoprim (Bactrim DS) 800-160 MG per tablet, Take 1 tablet by mouth 2 (Two) Times a Day. (Patient not taking: Reported on 2025), Disp: 20 tablet, Rfl: 0    Allergies:   No Known Allergies    Objective     Physical Exam:   Vital Signs:   Vitals:    25 1400   Weight: 109 kg (240 lb)   Height: 162.6 cm (64.02\")     Body mass index is 41.18 kg/m².     Physical Exam  Vitals and nursing note reviewed.   Constitutional:       Appearance: Normal " appearance.   HENT:      Head: Normocephalic and atraumatic.   Cardiovascular:      Comments: Well perfused  Pulmonary:      Effort: Pulmonary effort is normal.   Abdominal:      General: Abdomen is flat.      Palpations: Abdomen is soft.   Musculoskeletal:         General: Normal range of motion.   Skin:     General: Skin is warm and dry.   Neurological:      General: No focal deficit present.      Mental Status: She is alert and oriented to person, place, and time. Mental status is at baseline.   Psychiatric:         Mood and Affect: Mood normal.         Behavior: Behavior normal.         Thought Content: Thought content normal.         Judgment: Judgment normal.         Labs:   Brief Urine Lab Results  (Last result in the past 365 days)        Color   Clarity   Blood   Leuk Est   Nitrite   Protein   CREAT   Urine HCG        03/24/25 1646               Negative               Urine Culture          4/4/2025    16:12   Urine Culture   Urine Culture 50,000 CFU/mL Mixed Gram Positive Uma         Lab Results   Component Value Date    GLUCOSE 106 (H) 03/24/2025    CALCIUM 9.3 03/24/2025     03/24/2025    K 3.9 03/24/2025    CO2 23.0 03/24/2025     03/24/2025    BUN 10 03/24/2025    CREATININE 0.87 03/24/2025    BCR 11.5 03/24/2025    ANIONGAP 15.0 03/24/2025       Lab Results   Component Value Date    WBC 15.96 (H) 03/24/2025    HGB 15.5 03/24/2025    HCT 46.9 (H) 03/24/2025    MCV 85.0 03/24/2025     03/24/2025         Images:   US Renal Bilateral  Result Date: 4/28/2025  1. Grossly unremarkable ultrasound of the kidneys. Hydronephrosis on the right has resolved. 2. Grossly unremarkable limited ultrasound of the bladder. Electronically Signed: Eugene Sesay MD  4/28/2025 3:01 PM EDT  Workstation ID: [id number]    CT Abdomen Pelvis With Contrast  Result Date: 3/24/2025  Impression: Mild to moderate right-sided hydroureteronephrosis secondary to 1 or 2 adjacent calculi in the distal right ureter at  the right UVJ, in total measuring 8 x 3 mm. The ureteral calculi are new from prior CT while the right-sided hydronephrosis is similar to prior CT. There is delayed excretion of the right kidney on the delayed phase images. There is some right urothelial enhancement. Correlate with urinalysis. Duplex left kidney with duplicated ureters; no evidence of left-sided hydronephrosis or nephrolithiasis. Small amount of air within the urinary bladder lumen likely reflecting recent instrumentation. Correlate with urinalysis. Incidental note of a 3.0 cm cyst of the left ovary/adnexa. Electronically Signed: Bry Lloyd MD  3/24/2025 5:58 PM EDT  Workstation ID: [id number]    CT Abdomen Pelvis Without Contrast  Result Date: 3/23/2025  Impression: 1.There is moderate right-sided hydronephrosis with asymmetric right-sided periureteric and perinephric stranding. There is no evidence of a right ureteral stone. There are multiple nonobstructing right-sided kidney stones. The appearance of the right kidney and collecting system could be due to a recently passed stone or, less likely, urinary tract infection. 2.A small amount of gas in the urinary bladder that could be from recent instrumentation or cystitis. 3.46 mm simple appearing left ovarian cyst. Electronically Signed: Sabino Ledesma MD  3/23/2025 3:14 AM EDT  Workstation ID: [id number]      Measures:   Tobacco:   Alla Selby  reports that she has quit smoking. Her smoking use included cigarettes. She started smoking about 26 years ago. She has a 13.2 pack-year smoking history. She has been exposed to tobacco smoke. She has never used smokeless tobacco. I have educated her on the risk of diseases from using tobacco product.     Assessment / Plan      Assessment/Plan:   Alla Selby is a 45 y.o. female who presented today with nephrolithiasis/ureterolithiasis .  With no stone burden, resolved hydronephrosis.  We have discussed conservative factors to reduce stone  risk in the future including increasing fluid intake to greater than 2-2 and half liters, decreasing sodium and animal protein intake, increasing citrate intake.  She will follow-up in 1 year with ultrasound    Diagnoses and all orders for this visit:    1. Nephrolithiasis (Primary)  -     US Renal Bilateral; Future             Follow Up:   Return in about 1 year (around 4/29/2026) for Recheck.    I spent approximately 30 minutes providing clinical care for this patient; including review of patient's chart and provider documentation, face to face time spent with patient in examination room (obtaining history, performing physical exam, discussing diagnosis and management options), placing orders, and completing patient documentation.     Fab Zamorano MD  [facility]

## 2025-05-05 PROBLEM — N20.0 NEPHROLITHIASIS: Status: ACTIVE | Noted: 2025-05-05

## 2025-06-10 ENCOUNTER — TELEPHONE (OUTPATIENT)
Dept: OBSTETRICS AND GYNECOLOGY | Facility: CLINIC | Age: 45
End: 2025-06-10
Payer: COMMERCIAL

## 2025-06-11 NOTE — TELEPHONE ENCOUNTER
Please apologize to patient for delay in communicating the results of her ultrasound. There was resolution of her previously seen left ovarian cyst. Please let me know if she has any questions or concerns.     Thanks, Venice Moralez MD

## 2025-06-12 NOTE — TELEPHONE ENCOUNTER
Group Topic: BH RAMOS Process Group    Date: 4/23/2020  Start Time: 1345  End Time: 1430  Facilitators: Mckenzie Hill LPC    Focus:check out   Number in attendance: 8       Handouts: recovery prepardness  Method: Group  Attendance: Present  Mood/Affect: Appropriate  Behavior/Socialization: Appropriate to group  Participation: Active  Overall Patient Response to Group: Appropriate to topic  Individual Response to Group: Pt shared his plans to attend the group recovery meeting this evening. Pt offered supportive feedback to peers.  Ability to Apply Content to Group: 5     Mckenzie Hill MS, LPC, SAC-IT        Left message for pt to return call.  Sent MyChart message to pt. Her voicemail requested texts to be sent if possible.

## 2025-07-21 ENCOUNTER — OFFICE VISIT (OUTPATIENT)
Dept: OBSTETRICS AND GYNECOLOGY | Facility: CLINIC | Age: 45
End: 2025-07-21
Payer: COMMERCIAL

## 2025-07-21 VITALS — WEIGHT: 241 LBS | SYSTOLIC BLOOD PRESSURE: 138 MMHG | DIASTOLIC BLOOD PRESSURE: 80 MMHG | BODY MASS INDEX: 41.35 KG/M2

## 2025-07-21 DIAGNOSIS — Z97.5 NEXPLANON IN PLACE: ICD-10-CM

## 2025-07-21 DIAGNOSIS — Z30.09 CONSULTATION FOR FEMALE STERILIZATION: Primary | ICD-10-CM

## 2025-07-21 PROCEDURE — 99213 OFFICE O/P EST LOW 20 MIN: CPT | Performed by: OBSTETRICS & GYNECOLOGY

## 2025-07-21 NOTE — PROGRESS NOTES
Subjective   Chief Complaint   Patient presents with    Pre-op Exam     Nexplanon removal, bilateral salpingectomy        Alla Selby is a 45 y.o. year old  who is scheduled  for surgery due to desire for permanent sterilization.    Past Medical History:   Diagnosis Date    Anxiety 2019    Fibroid 2019    Breasts    H/O chlamydia infection     History of anxiety     Hyperlipidemia     Hypertension     Early  - Lisinopril    Kidney stone 3/5/2025    Liver disease 3/5/25    fatty liver    Trauma     Vaginal infection     Varicella      Past Surgical History:   Procedure Laterality Date    GUM SURGERY      KIDNEY STONE SURGERY  3/17/25    LITHOTRIPSY  3/17/24    URETEROSCOPY LASER LITHOTRIPSY WITH STENT INSERTION Right 2025    Procedure: URETEROSCOPY LASER LITHOTRIPSY WITH STENT INSERTION RIGHT;  Surgeon: Fab Zamorano MD;  [facility]: [facility];  Service: Urology;  Laterality: Right;    WISDOM TOOTH EXTRACTION       OB History    Para Term  AB Living   1 0 0 0 1 0   SAB IAB Ectopic Molar Multiple Live Births   0 1 0 0 0 0      # Outcome Date GA Lbr Hiren/2nd Weight Sex Type Anes PTL Lv   1 IAB 2003     TAB         Obstetric Comments   Reports history of chlamydia      Social History     Socioeconomic History    Marital status: Single   Tobacco Use    Smoking status: Former     Current packs/day: 0.50     Average packs/day: 0.5 packs/day for 26.6 years (13.3 ttl pk-yrs)     Types: Cigarettes     Start date:      Passive exposure: Current    Smokeless tobacco: Never    Tobacco comments:     Smoked menthol 1 pack a day from 9485-4325   Vaping Use    Vaping status: Never Used   Substance and Sexual Activity    Alcohol use: Yes     Alcohol/week: 10.0 standard drinks of alcohol     Types: 4 Glasses of wine, 6 Cans of beer per week     Comment: 6 pack of beer on weekends, or a couple bottles of red wine    Drug use: No    Sexual activity: Yes     Partners: Male      Birth control/protection: Nexplanon     Comment: Nexplanon  25. Appt sched 3/31 to discuss tubal     Prior to Admission medications    Medication Sig Start Date End Date Taking? Authorizing Provider   diazePAM (VALIUM) 5 MG tablet Take 1 tablet by mouth Daily. 24  Yes Hector Burrell MD   Etonogestrel (NEXPLANON SC) Nexplanon   Yes Hector Burrell MD   lamoTRIgine (LaMICtal) 25 MG tablet Take 3 tablets by mouth every night at bedtime. 24  Yes Hector Burrell MD   lansoprazole (PREVACID) 30 MG capsule Take 1 capsule by mouth Daily. 9/15/20  Yes Chepe Steven MD   lisinopril-hydrochlorothiazide (PRINZIDE,ZESTORETIC) 10-12.5 MG per tablet  24  Yes Hector Burrell MD   HYDROcodone-acetaminophen (NORCO)  MG per tablet Take 1 tablet by mouth Every 6 (Six) Hours As Needed for Moderate Pain. 3/24/25   Edgar Kirkland MD   SEMAGLUTIDE-WEIGHT MANAGEMENT SC Inject 1 vial under the skin into the appropriate area as directed 1 (One) Time Per Week.  Patient not taking: Reported on 2025    Hector Burrell MD   sulfamethoxazole-trimethoprim (Bactrim DS) 800-160 MG per tablet Take 1 tablet by mouth 2 (Two) Times a Day.  Patient not taking: Reported on 2025   Fab Zamorano MD       No Known Allergies  Social History    Tobacco Use      Smoking status: Former        Packs/day: 0.50        Years: 0.5 packs/day for 26.6 years (13.3 ttl pk-yrs)        Types: Cigarettes        Start date:         Passive exposure: Current      Smokeless tobacco: Never      Tobacco comments: Smoked menthol 1 pack a day from 9681-4187    Review of Systems   Constitutional:  Negative for chills and fever.   HENT:  Negative for congestion and sore throat.    Respiratory:  Negative for shortness of breath and wheezing.    Cardiovascular:  Negative for chest pain and palpitations.   Gastrointestinal:  Negative for abdominal pain, nausea and vomiting.    Genitourinary:  Negative for frequency, vaginal bleeding and vaginal pain.   Musculoskeletal:  Negative for back pain and myalgias.   Neurological:  Negative for dizziness, light-headedness and headaches.   Psychiatric/Behavioral:  Negative for confusion. The patient is not nervous/anxious.          Objective   /80   Wt 109 kg (241 lb)   LMP 06/26/2025 (Approximate)   BMI 41.35 kg/m²   General: well developed; well nourished  no acute distress  mentation appropriate  Alert and oriented x3   Heart: Not performed.   Lungs: breathing is unlabored   Abdomen: soft, non-tender; no masses  no umbilical or inguinal hernias are present  no hepato-splenomegaly   Pelvis:: Not performed.        Assessment   Desires permanent sterilization   Nexplanon in place      Plan   Proceed with laparoscopic bilateral salpingectomy and Nexplanon removal  Today I discussed with Alla the risks of her upcoming surgical procedure. Risks including intraoperative bleeding, infection at the site of surgery, damage to the adjacent surrounding organs, catheter induced urinary tract infections and the small risk for deep vein thrombosis were all explained. Additionally, the small risk for reoperation in the event of unanticipated bleeding or surgical injury was discussed.  All of her questions were answered fully.  She left with a very clear understanding of the preoperative surgical indications and the nature of the surgery for which she is scheduled.  She understands to be NPO after midnight and to be at the preoperative area ~ 1-1/2 hours prior to the scheduled surgical start time.            Venice Moralez MD  7/21/2025       (Pt's PCP is Neo El MD)

## 2025-07-28 ENCOUNTER — LAB REQUISITION (OUTPATIENT)
Dept: LAB | Facility: HOSPITAL | Age: 45
End: 2025-07-28
Payer: COMMERCIAL

## 2025-07-28 ENCOUNTER — OUTSIDE FACILITY SERVICE (OUTPATIENT)
Dept: OBSTETRICS AND GYNECOLOGY | Facility: CLINIC | Age: 45
End: 2025-07-28
Payer: COMMERCIAL

## 2025-07-28 DIAGNOSIS — Z30.2 ENCOUNTER FOR STERILIZATION: ICD-10-CM

## 2025-07-28 PROCEDURE — 88302 TISSUE EXAM BY PATHOLOGIST: CPT | Performed by: OBSTETRICS & GYNECOLOGY

## 2025-07-28 RX ORDER — ACETAMINOPHEN 500 MG
500 TABLET ORAL EVERY 6 HOURS PRN
Qty: 60 TABLET | Refills: 1 | Status: SHIPPED | OUTPATIENT
Start: 2025-07-28 | End: 2026-07-28

## 2025-07-28 RX ORDER — DOCUSATE SODIUM 100 MG/1
100 CAPSULE, LIQUID FILLED ORAL 2 TIMES DAILY PRN
Qty: 60 CAPSULE | Refills: 1 | Status: SHIPPED | OUTPATIENT
Start: 2025-07-28

## 2025-07-28 RX ORDER — IBUPROFEN 600 MG/1
600 TABLET, FILM COATED ORAL EVERY 6 HOURS PRN
Qty: 60 TABLET | Refills: 1 | Status: SHIPPED | OUTPATIENT
Start: 2025-07-28

## 2025-07-29 LAB
CYTO UR: NORMAL
LAB AP CASE REPORT: NORMAL
LAB AP CLINICAL INFORMATION: NORMAL
PATH REPORT.FINAL DX SPEC: NORMAL
PATH REPORT.GROSS SPEC: NORMAL

## 2025-08-08 RX ORDER — CEPHALEXIN 500 MG/1
500 CAPSULE ORAL 4 TIMES DAILY
Qty: 28 CAPSULE | Refills: 0 | Status: SHIPPED | OUTPATIENT
Start: 2025-08-08 | End: 2025-08-15

## 2025-08-08 RX ORDER — FLUCONAZOLE 150 MG/1
150 TABLET ORAL DAILY
Qty: 1 TABLET | Refills: 0 | Status: SHIPPED | OUTPATIENT
Start: 2025-08-08

## 2025-08-11 ENCOUNTER — OFFICE VISIT (OUTPATIENT)
Dept: OBSTETRICS AND GYNECOLOGY | Facility: CLINIC | Age: 45
End: 2025-08-11
Payer: COMMERCIAL

## 2025-08-11 VITALS
DIASTOLIC BLOOD PRESSURE: 80 MMHG | WEIGHT: 229 LBS | SYSTOLIC BLOOD PRESSURE: 128 MMHG | BODY MASS INDEX: 39.29 KG/M2 | RESPIRATION RATE: 14 BRPM

## 2025-08-11 DIAGNOSIS — Z90.79 STATUS POST BILATERAL SALPINGECTOMY: Primary | ICD-10-CM

## 2025-08-11 PROCEDURE — 99024 POSTOP FOLLOW-UP VISIT: CPT | Performed by: OBSTETRICS & GYNECOLOGY

## 2025-08-11 RX ORDER — CLOBETASOL PROPIONATE 0.5 MG/ML
SOLUTION TOPICAL
COMMUNITY

## 2025-08-11 RX ORDER — MUPIROCIN 2 %
1 OINTMENT (GRAM) TOPICAL 2 TIMES DAILY
Qty: 14 G | Refills: 0 | Status: SHIPPED | OUTPATIENT
Start: 2025-08-11 | End: 2025-08-18

## (undated) DEVICE — FIBR LASR SOLTIVE BALL/TP 200MICRON 1P/U

## (undated) DEVICE — NITINOL WIRE WITH HYDROPHILIC TIP: Brand: SENSOR

## (undated) DEVICE — SYR LUERLOK 30CC

## (undated) DEVICE — PK CYSTO-TUR BASIC 10

## (undated) DEVICE — GLV SURG BIOGEL LTX PF 7 1/2

## (undated) DEVICE — SYR LL TP 10ML STRL

## (undated) DEVICE — CVR FTSWITCH UNIV